# Patient Record
Sex: FEMALE | Race: WHITE | NOT HISPANIC OR LATINO | Employment: OTHER | URBAN - METROPOLITAN AREA
[De-identification: names, ages, dates, MRNs, and addresses within clinical notes are randomized per-mention and may not be internally consistent; named-entity substitution may affect disease eponyms.]

---

## 2017-01-09 ENCOUNTER — OFFICE VISIT - HEALTHEAST (OUTPATIENT)
Dept: FAMILY MEDICINE | Facility: CLINIC | Age: 62
End: 2017-01-09

## 2017-01-09 DIAGNOSIS — R05.9 COUGH: ICD-10-CM

## 2017-01-09 ASSESSMENT — MIFFLIN-ST. JEOR: SCORE: 1178.18

## 2017-05-12 ENCOUNTER — COMMUNICATION - HEALTHEAST (OUTPATIENT)
Dept: FAMILY MEDICINE | Facility: CLINIC | Age: 62
End: 2017-05-12

## 2017-07-01 ENCOUNTER — HEALTH MAINTENANCE LETTER (OUTPATIENT)
Age: 62
End: 2017-07-01

## 2017-08-28 ENCOUNTER — COMMUNICATION - HEALTHEAST (OUTPATIENT)
Dept: FAMILY MEDICINE | Facility: CLINIC | Age: 62
End: 2017-08-28

## 2017-08-28 DIAGNOSIS — E78.5 HYPERLIPEMIA: ICD-10-CM

## 2017-09-02 ENCOUNTER — COMMUNICATION - HEALTHEAST (OUTPATIENT)
Dept: FAMILY MEDICINE | Facility: CLINIC | Age: 62
End: 2017-09-02

## 2017-09-02 DIAGNOSIS — M54.16 LUMBAR RADICULOPATHY: ICD-10-CM

## 2017-10-22 ENCOUNTER — COMMUNICATION - HEALTHEAST (OUTPATIENT)
Dept: FAMILY MEDICINE | Facility: CLINIC | Age: 62
End: 2017-10-22

## 2017-10-22 DIAGNOSIS — I10 ESSENTIAL HYPERTENSION: ICD-10-CM

## 2017-12-01 ENCOUNTER — COMMUNICATION - HEALTHEAST (OUTPATIENT)
Dept: FAMILY MEDICINE | Facility: CLINIC | Age: 62
End: 2017-12-01

## 2017-12-01 DIAGNOSIS — E78.5 HYPERLIPEMIA: ICD-10-CM

## 2018-01-19 ENCOUNTER — COMMUNICATION - HEALTHEAST (OUTPATIENT)
Dept: FAMILY MEDICINE | Facility: CLINIC | Age: 63
End: 2018-01-19

## 2018-01-19 DIAGNOSIS — I10 ESSENTIAL HYPERTENSION: ICD-10-CM

## 2018-02-27 ENCOUNTER — OFFICE VISIT - HEALTHEAST (OUTPATIENT)
Dept: FAMILY MEDICINE | Facility: CLINIC | Age: 63
End: 2018-02-27

## 2018-02-27 DIAGNOSIS — M89.9 DISORDER OF BONE AND CARTILAGE: ICD-10-CM

## 2018-02-27 DIAGNOSIS — Z12.31 VISIT FOR SCREENING MAMMOGRAM: ICD-10-CM

## 2018-02-27 DIAGNOSIS — I10 HTN (HYPERTENSION): ICD-10-CM

## 2018-02-27 DIAGNOSIS — M94.9 DISORDER OF BONE AND CARTILAGE: ICD-10-CM

## 2018-02-27 DIAGNOSIS — E78.00 HYPERCHOLESTEREMIA: ICD-10-CM

## 2018-02-27 DIAGNOSIS — M54.16 LUMBAR RADICULOPATHY: ICD-10-CM

## 2018-02-27 DIAGNOSIS — R53.83 FATIGUE: ICD-10-CM

## 2018-02-27 DIAGNOSIS — Z00.00 ROUTINE GENERAL MEDICAL EXAMINATION AT A HEALTH CARE FACILITY: ICD-10-CM

## 2018-02-27 LAB
ALBUMIN SERPL-MCNC: 3.7 G/DL (ref 3.5–5)
ALP SERPL-CCNC: 85 U/L (ref 45–120)
ALT SERPL W P-5'-P-CCNC: 26 U/L (ref 0–45)
ANION GAP SERPL CALCULATED.3IONS-SCNC: 9 MMOL/L (ref 5–18)
AST SERPL W P-5'-P-CCNC: 26 U/L (ref 0–40)
BILIRUB SERPL-MCNC: 0.6 MG/DL (ref 0–1)
BUN SERPL-MCNC: 13 MG/DL (ref 8–22)
CALCIUM SERPL-MCNC: 8.9 MG/DL (ref 8.5–10.5)
CHLORIDE BLD-SCNC: 107 MMOL/L (ref 98–107)
CHOLEST SERPL-MCNC: 193 MG/DL
CO2 SERPL-SCNC: 24 MMOL/L (ref 22–31)
CREAT SERPL-MCNC: 0.64 MG/DL (ref 0.6–1.1)
ERYTHROCYTE [DISTWIDTH] IN BLOOD BY AUTOMATED COUNT: 12.6 % (ref 11–14.5)
FASTING STATUS PATIENT QL REPORTED: YES
GFR SERPL CREATININE-BSD FRML MDRD: >60 ML/MIN/1.73M2
GLUCOSE BLD-MCNC: 86 MG/DL (ref 70–125)
HBA1C MFR BLD: 5.6 % (ref 3.5–6)
HCT VFR BLD AUTO: 39.9 % (ref 35–47)
HDLC SERPL-MCNC: 38 MG/DL
HGB BLD-MCNC: 13.4 G/DL (ref 12–16)
LDLC SERPL CALC-MCNC: 113 MG/DL
MCH RBC QN AUTO: 29.9 PG (ref 27–34)
MCHC RBC AUTO-ENTMCNC: 33.6 G/DL (ref 32–36)
MCV RBC AUTO: 89 FL (ref 80–100)
PLATELET # BLD AUTO: 254 THOU/UL (ref 140–440)
PMV BLD AUTO: 7.2 FL (ref 7–10)
POTASSIUM BLD-SCNC: 3.9 MMOL/L (ref 3.5–5)
PROT SERPL-MCNC: 6.5 G/DL (ref 6–8)
RBC # BLD AUTO: 4.49 MILL/UL (ref 3.8–5.4)
SODIUM SERPL-SCNC: 140 MMOL/L (ref 136–145)
TRIGL SERPL-MCNC: 209 MG/DL
TSH SERPL DL<=0.005 MIU/L-ACNC: 1.97 UIU/ML (ref 0.3–5)
WBC: 6 THOU/UL (ref 4–11)

## 2018-02-27 ASSESSMENT — MIFFLIN-ST. JEOR: SCORE: 1220.14

## 2018-02-28 LAB
25(OH)D3 SERPL-MCNC: 50.6 NG/ML (ref 30–80)
25(OH)D3 SERPL-MCNC: 50.6 NG/ML (ref 30–80)

## 2018-03-01 ENCOUNTER — COMMUNICATION - HEALTHEAST (OUTPATIENT)
Dept: FAMILY MEDICINE | Facility: CLINIC | Age: 63
End: 2018-03-01

## 2018-03-01 DIAGNOSIS — E78.5 HYPERLIPEMIA: ICD-10-CM

## 2018-03-04 ENCOUNTER — COMMUNICATION - HEALTHEAST (OUTPATIENT)
Dept: FAMILY MEDICINE | Facility: CLINIC | Age: 63
End: 2018-03-04

## 2018-03-04 DIAGNOSIS — M54.16 LUMBAR RADICULOPATHY: ICD-10-CM

## 2018-04-11 ENCOUNTER — COMMUNICATION - HEALTHEAST (OUTPATIENT)
Dept: FAMILY MEDICINE | Facility: CLINIC | Age: 63
End: 2018-04-11

## 2018-04-18 ENCOUNTER — COMMUNICATION - HEALTHEAST (OUTPATIENT)
Dept: FAMILY MEDICINE | Facility: CLINIC | Age: 63
End: 2018-04-18

## 2018-04-19 ENCOUNTER — COMMUNICATION - HEALTHEAST (OUTPATIENT)
Dept: FAMILY MEDICINE | Facility: CLINIC | Age: 63
End: 2018-04-19

## 2018-04-19 DIAGNOSIS — I10 ESSENTIAL HYPERTENSION: ICD-10-CM

## 2018-04-23 ENCOUNTER — COMMUNICATION - HEALTHEAST (OUTPATIENT)
Dept: FAMILY MEDICINE | Facility: CLINIC | Age: 63
End: 2018-04-23

## 2018-05-30 ENCOUNTER — COMMUNICATION - HEALTHEAST (OUTPATIENT)
Dept: FAMILY MEDICINE | Facility: CLINIC | Age: 63
End: 2018-05-30

## 2018-05-30 DIAGNOSIS — E78.5 HYPERLIPEMIA: ICD-10-CM

## 2018-07-17 ENCOUNTER — COMMUNICATION - HEALTHEAST (OUTPATIENT)
Dept: FAMILY MEDICINE | Facility: CLINIC | Age: 63
End: 2018-07-17

## 2018-10-13 ENCOUNTER — COMMUNICATION - HEALTHEAST (OUTPATIENT)
Dept: FAMILY MEDICINE | Facility: CLINIC | Age: 63
End: 2018-10-13

## 2019-01-12 ENCOUNTER — COMMUNICATION - HEALTHEAST (OUTPATIENT)
Dept: FAMILY MEDICINE | Facility: CLINIC | Age: 64
End: 2019-01-12

## 2019-02-06 ENCOUNTER — RECORDS - HEALTHEAST (OUTPATIENT)
Dept: ADMINISTRATIVE | Facility: OTHER | Age: 64
End: 2019-02-06

## 2019-02-06 ENCOUNTER — APPOINTMENT (OUTPATIENT)
Dept: CT IMAGING | Facility: CLINIC | Age: 64
End: 2019-02-06
Attending: EMERGENCY MEDICINE
Payer: COMMERCIAL

## 2019-02-06 ENCOUNTER — HOSPITAL ENCOUNTER (EMERGENCY)
Facility: CLINIC | Age: 64
Discharge: HOME | End: 2019-02-07
Attending: EMERGENCY MEDICINE | Admitting: EMERGENCY MEDICINE
Payer: COMMERCIAL

## 2019-02-06 DIAGNOSIS — R10.32 ABDOMINAL PAIN, LEFT LOWER QUADRANT: ICD-10-CM

## 2019-02-06 DIAGNOSIS — K92.1 BLOOD IN STOOL: ICD-10-CM

## 2019-02-06 LAB
ANION GAP SERPL CALCULATED.3IONS-SCNC: 6 MMOL/L (ref 3–14)
BASOPHILS # BLD AUTO: 0 10E9/L (ref 0–0.2)
BASOPHILS NFR BLD AUTO: 0.3 %
BUN SERPL-MCNC: 9 MG/DL (ref 7–30)
CALCIUM SERPL-MCNC: 8.6 MG/DL (ref 8.5–10.1)
CHLORIDE SERPL-SCNC: 103 MMOL/L (ref 94–109)
CO2 SERPL-SCNC: 30 MMOL/L (ref 20–32)
CREAT SERPL-MCNC: 0.72 MG/DL (ref 0.52–1.04)
DIFFERENTIAL METHOD BLD: ABNORMAL
EOSINOPHIL # BLD AUTO: 0 10E9/L (ref 0–0.7)
EOSINOPHIL NFR BLD AUTO: 0.3 %
ERYTHROCYTE [DISTWIDTH] IN BLOOD BY AUTOMATED COUNT: 12.4 % (ref 10–15)
GFR SERPL CREATININE-BSD FRML MDRD: 89 ML/MIN/{1.73_M2}
GLUCOSE SERPL-MCNC: 106 MG/DL (ref 70–99)
HCT VFR BLD AUTO: 45.1 % (ref 35–47)
HEMOCCULT STL QL: POSITIVE
HGB BLD-MCNC: 14.9 G/DL (ref 11.7–15.7)
IMM GRANULOCYTES # BLD: 0 10E9/L (ref 0–0.4)
IMM GRANULOCYTES NFR BLD: 0.3 %
INTERNAL QC OK POCT: YES
LYMPHOCYTES # BLD AUTO: 2.8 10E9/L (ref 0.8–5.3)
LYMPHOCYTES NFR BLD AUTO: 20.7 %
MCH RBC QN AUTO: 29.6 PG (ref 26.5–33)
MCHC RBC AUTO-ENTMCNC: 33 G/DL (ref 31.5–36.5)
MCV RBC AUTO: 90 FL (ref 78–100)
MONOCYTES # BLD AUTO: 1 10E9/L (ref 0–1.3)
MONOCYTES NFR BLD AUTO: 7.2 %
NEUTROPHILS # BLD AUTO: 9.8 10E9/L (ref 1.6–8.3)
NEUTROPHILS NFR BLD AUTO: 71.2 %
NRBC # BLD AUTO: 0 10*3/UL
NRBC BLD AUTO-RTO: 0 /100
PLATELET # BLD AUTO: 336 10E9/L (ref 150–450)
POTASSIUM SERPL-SCNC: 3.5 MMOL/L (ref 3.4–5.3)
RBC # BLD AUTO: 5.03 10E12/L (ref 3.8–5.2)
SODIUM SERPL-SCNC: 139 MMOL/L (ref 133–144)
TEST CARD LOT NUMBER: ABNORMAL
WBC # BLD AUTO: 13.7 10E9/L (ref 4–11)

## 2019-02-06 PROCEDURE — 82272 OCCULT BLD FECES 1-3 TESTS: CPT | Performed by: EMERGENCY MEDICINE

## 2019-02-06 PROCEDURE — 80048 BASIC METABOLIC PNL TOTAL CA: CPT | Performed by: EMERGENCY MEDICINE

## 2019-02-06 PROCEDURE — 25000125 ZZHC RX 250: Performed by: EMERGENCY MEDICINE

## 2019-02-06 PROCEDURE — 96361 HYDRATE IV INFUSION ADD-ON: CPT | Performed by: EMERGENCY MEDICINE

## 2019-02-06 PROCEDURE — 99284 EMERGENCY DEPT VISIT MOD MDM: CPT | Mod: Z6 | Performed by: EMERGENCY MEDICINE

## 2019-02-06 PROCEDURE — 96374 THER/PROPH/DIAG INJ IV PUSH: CPT | Performed by: EMERGENCY MEDICINE

## 2019-02-06 PROCEDURE — 25000128 H RX IP 250 OP 636: Performed by: EMERGENCY MEDICINE

## 2019-02-06 PROCEDURE — 99285 EMERGENCY DEPT VISIT HI MDM: CPT | Mod: 25 | Performed by: EMERGENCY MEDICINE

## 2019-02-06 PROCEDURE — 74177 CT ABD & PELVIS W/CONTRAST: CPT

## 2019-02-06 PROCEDURE — 85025 COMPLETE CBC W/AUTO DIFF WBC: CPT | Performed by: EMERGENCY MEDICINE

## 2019-02-06 RX ORDER — IOPAMIDOL 755 MG/ML
78 INJECTION, SOLUTION INTRAVASCULAR ONCE
Status: COMPLETED | OUTPATIENT
Start: 2019-02-06 | End: 2019-02-06

## 2019-02-06 RX ORDER — ONDANSETRON 2 MG/ML
4 INJECTION INTRAMUSCULAR; INTRAVENOUS
Status: COMPLETED | OUTPATIENT
Start: 2019-02-06 | End: 2019-02-06

## 2019-02-06 RX ADMIN — SODIUM CHLORIDE 59 ML: 9 INJECTION, SOLUTION INTRAVENOUS at 22:50

## 2019-02-06 RX ADMIN — SODIUM CHLORIDE, POTASSIUM CHLORIDE, SODIUM LACTATE AND CALCIUM CHLORIDE 1000 ML: 600; 310; 30; 20 INJECTION, SOLUTION INTRAVENOUS at 22:30

## 2019-02-06 RX ADMIN — ONDANSETRON 4 MG: 2 INJECTION INTRAMUSCULAR; INTRAVENOUS at 22:31

## 2019-02-06 RX ADMIN — IOPAMIDOL 78 ML: 755 INJECTION, SOLUTION INTRAVENOUS at 22:50

## 2019-02-06 ASSESSMENT — ENCOUNTER SYMPTOMS
NEUROLOGICAL NEGATIVE: 1
ABDOMINAL PAIN: 1
CONSTITUTIONAL NEGATIVE: 1
EYES NEGATIVE: 1
NAUSEA: 1
PSYCHIATRIC NEGATIVE: 1
BLOOD IN STOOL: 1
ENDOCRINE NEGATIVE: 1
ALLERGIC/IMMUNOLOGIC NEGATIVE: 1
RESPIRATORY NEGATIVE: 1
HEMATOLOGIC/LYMPHATIC NEGATIVE: 1

## 2019-02-06 ASSESSMENT — MIFFLIN-ST. JEOR: SCORE: 1197.26

## 2019-02-06 NOTE — ED AVS SNAPSHOT
[facility] Emergency Department  [address] [address]  Phone:  [phone]  Fax:  [phone]                                    Dahlia Torres   MRN: [id number]    Department:  [facility] Emergency Department   Date of Visit:  2/6/2019           After Visit Summary Signature Page    I have received my discharge instructions, and my questions have been answered. I have discussed any challenges I see with this plan with the nurse or doctor.    ..........................................................................................................................................  Patient/Patient Representative Signature      ..........................................................................................................................................  Patient Representative Print Name and Relationship to Patient    ..................................................               ................................................  Date                                   Time    ..........................................................................................................................................  Reviewed by Signature/Title    ...................................................              ..............................................  Date                                               Time          22EPIC Rev 08/18

## 2019-02-07 VITALS
SYSTOLIC BLOOD PRESSURE: 132 MMHG | HEART RATE: 98 BPM | DIASTOLIC BLOOD PRESSURE: 86 MMHG | OXYGEN SATURATION: 96 % | WEIGHT: 160 LBS | HEIGHT: 60 IN | BODY MASS INDEX: 31.41 KG/M2 | RESPIRATION RATE: 18 BRPM | TEMPERATURE: 99.1 F

## 2019-02-07 NOTE — DISCHARGE INSTRUCTIONS
1) exact cause of your sudden onset of lower abdominal pain and blood in your stool is not clear.  Your workup and time in the emergency department does not suggest a life-threatening GI bleed, however this cannot be completely excluded.  You have an abnormal CT scan showing inflammation in the descending colon and sigmoid colon  2) we have discussed that the exact cause of the inflammation noted on your CAT scan imaging is not clear.  There is no indication for antibiotics at this time.  3)return for reevaluation if bleeding worsens or you  develop progressive abdominal pain or develop a fever or any new symptoms of concern  4) follow-up care with [facility] is recommended for colonoscopy and additional care

## 2019-02-07 NOTE — ED NOTES
63-year-old female presents to the ER with complaints of lower abd cramping and bright red bloody stools that started last night about midnight. Pt states she has never had this happen before. Pt states some of the bloody has come out with mucus as well. No nausea and vomiting.

## 2019-02-07 NOTE — ED PROVIDER NOTES
History     Chief Complaint   Patient presents with     Rectal Bleeding     Abdominal Pain     HPI  Dahlia Torres is a 63 year old female who presents for evaluation for lower abdominal cramping and bloody stools within the last 24 hours.. Patient arrived by private car with her .  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  She reports lower abdominal cramping and bloody stools over the last 24 hours since around midnight.  She is having intermittent bloody stools.  She is reporting no prior history of diverticulitis per report she had a colonoscopy about 10 years ago that showed diverticulosis.  She is currently on lisinopril hydrochlorothiazide for hypertension, gabapentin for restless legs.  She is reporting no recent antibiotic use, no fever, no chills no back pain.  No unintentional weight loss.. History of external hemorrhoids because of persistent bloody stools, with lower abdominal cramping she presented for further care and evaluation.    Allergies:  No Known Allergies    Problem List:    There are no active problems to display for this patient.       Past Medical History:    No past medical history on file.    Past Surgical History:    No past surgical history on file.    Family History:    No family history on file.    Social History:  Marital Status:   [2]  Social History     Tobacco Use     Smoking status: Not on file   Substance Use Topics     Alcohol use: Not on file     Drug use: Not on file        Medications:      No current outpatient medications on file.      Review of Systems   Constitutional: Negative.    HENT: Negative.    Eyes: Negative.    Respiratory: Negative.    Gastrointestinal: Positive for abdominal pain, blood in stool and nausea.   Endocrine: Negative.    Genitourinary: Negative.    Skin: Negative.    Allergic/Immunologic: Negative.    Neurological: Negative.    Hematological: Negative.    Psychiatric/Behavioral: Negative.    All other  systems reviewed and are negative.      Physical Exam   BP: 138/89  Pulse: 110  Temp: 99.1  F (37.3  C)  Resp: 18  Height: 152.4 cm (5')  Weight: 72.6 kg (160 lb)  SpO2: 95 %      Physical Exam   Constitutional: She appears well-developed and well-nourished. No distress.   HENT:   Head: Normocephalic and atraumatic.   Eyes: EOM are normal. Pupils are equal, round, and reactive to light. Right eye exhibits no discharge. Left eye exhibits no discharge. No scleral icterus.   Neck: Normal range of motion. Neck supple.   Cardiovascular: Normal rate. Exam reveals no gallop and no friction rub.   No murmur heard.  Pulmonary/Chest: Effort normal and breath sounds normal. No stridor. No respiratory distress. She has no wheezes. She has no rales. She exhibits no tenderness.   Abdominal: Soft. There is tenderness. There is guarding. There is no rebound.       Genitourinary: Rectal exam shows guaiac positive stool.   Skin: Capillary refill takes less than 2 seconds. She is not diaphoretic.   Psychiatric: She has a normal mood and affect. Her behavior is normal. Judgment and thought content normal.       ED Course        Procedures               Critical Care time:  none                ED medications;  Medications   lactated ringers BOLUS 1,000 mL (1,000 mLs Intravenous New Bag 2/6/19 2230)   ondansetron (ZOFRAN) injection 4 mg (4 mg Intravenous Given 2/6/19 2231)   iopamidol (ISOVUE-370) solution 78 mL (78 mLs Intravenous Given 2/6/19 2250)   Saline Flush (59 mLs Intravenous Given 2/6/19 2250)       ED labs and imaging:  Results for orders placed or performed during the hospital encounter of 02/06/19 (from the past 24 hour(s))   CBC with platelets differential   Result Value Ref Range    WBC 13.7 (H) 4.0 - 11.0 10e9/L    RBC Count 5.03 3.8 - 5.2 10e12/L    Hemoglobin 14.9 11.7 - 15.7 g/dL    Hematocrit 45.1 35.0 - 47.0 %    MCV 90 78 - 100 fl    MCH 29.6 26.5 - 33.0 pg    MCHC 33.0 31.5 - 36.5 g/dL    RDW 12.4 10.0 - 15.0 %     Platelet Count 336 150 - 450 10e9/L    Diff Method Automated Method     % Neutrophils 71.2 %    % Lymphocytes 20.7 %    % Monocytes 7.2 %    % Eosinophils 0.3 %    % Basophils 0.3 %    % Immature Granulocytes 0.3 %    Nucleated RBCs 0 0 /100    Absolute Neutrophil 9.8 (H) 1.6 - 8.3 10e9/L    Absolute Lymphocytes 2.8 0.8 - 5.3 10e9/L    Absolute Monocytes 1.0 0.0 - 1.3 10e9/L    Absolute Eosinophils 0.0 0.0 - 0.7 10e9/L    Absolute Basophils 0.0 0.0 - 0.2 10e9/L    Abs Immature Granulocytes 0.0 0 - 0.4 10e9/L    Absolute Nucleated RBC 0.0    Basic metabolic panel   Result Value Ref Range    Sodium 139 133 - 144 mmol/L    Potassium 3.5 3.4 - 5.3 mmol/L    Chloride 103 94 - 109 mmol/L    Carbon Dioxide 30 20 - 32 mmol/L    Anion Gap 6 3 - 14 mmol/L    Glucose 106 (H) 70 - 99 mg/dL    Urea Nitrogen 9 7 - 30 mg/dL    Creatinine 0.72 0.52 - 1.04 mg/dL    GFR Estimate 89 >60 mL/min/[1.73_m2]    GFR Estimate If Black >90 >60 mL/min/[1.73_m2]    Calcium 8.6 8.5 - 10.1 mg/dL   CT Abdomen Pelvis w Contrast    Narrative    CT ABDOMEN AND PELVIS WITH CONTRAST   2/6/2019 10:57 PM     HISTORY: 24 hours of lower abdominal pain and cramping. Diarrhea.  Blood per rectum.    COMPARISON: None.    TECHNIQUE: Following the uneventful administration of 78 mL Isovue  -370 intravenous contrast, helical sections were acquired from the top  of the diaphragm through the pubic symphysis. Coronal reconstructions  were generated. Radiation dose for this scan was reduced using  automated exposure control, adjustment of the mA and/or kV according  to the patient's size, or iterative reconstruction technique.    FINDINGS:     Abdomen: The liver, spleen, pancreas and adrenal glands are  unremarkable. A few small low-attenuation lesions in the kidneys, too  small to characterize. The gallbladder is present. Small hiatal  hernia. No enlarged lymph nodes or free fluid in the upper abdomen.    Scan through the lower chest is significant for a small  calcified  granuloma in the left lung base.    Pelvis: The small and large bowel are normal in caliber. A few colonic  diverticula are present without convincing evidence of diverticulitis.  Mild wall thickening of the descending colon through the sigmoid  colon. Slight haziness is present within the fat about the  thick-walled colon. The appendix is unremarkable. No pneumatosis or  free intraperitoneal gas. The uterus is present. No enlarged lymph  nodes or free fluid in the pelvis.      Impression    IMPRESSION:   1. Mild wall thickening of the descending colon through the sigmoid  colon, likely representing infectious or inflammatory colitis.  2. No other cause of acute pain identified in the abdomen or pelvis.  3. Colonic diverticulosis without convincing evidence of  diverticulitis.     ED Vitals:  Vitals:    02/06/19 2200 02/06/19 2219 02/06/19 2230 02/06/19 2248   BP: 153/71  147/73    Pulse: 75  85    Resp:       Temp:       TempSrc:       SpO2: 97% 96% 95% 96%   Weight:       Height:             Assessments & Plan (with Medical Decision Making)   Clinical impression: 63-year-old female with a history of hypertension on lisinopril hydrochlorothiazide, history of restless leg on gabapentin, and a prior diagnosis of colonoscopy confirmed diverticulosis in 2009 who presented for evaluation for lower abdominal cramping and bright red blood per rectum.  Workup and imaging suggests acute inflammatory process involving the descending sigmoid colon which could be infectious or inflammatory.  Stable  GI bleed cannot be excluded.  Exam and imaging was not suspicious for mesenteric ischemia, or ischemic colitis.  Patient had no red flags on exam..  On my exam she was in no obvious distress she was hypertensive.  Her abdomen was soft non-distended with localized crampy discomfort in the lower abdomen.  Chaperoned rectal exam with Rosemary Ibarra RN-showed normal rectal tone, no external hemorrhoids appreciated no anal  fissure.  Guaiac positive.    ED course and Plan:  We discussed and reviewed differential diagnosis for lower abdominal cramping and bloody stools in the context of history of known diverticulosis confirmed by colonoscopy by report fropm 2009.  Patient understands that she is due for subsequent surveillance follow-up colonoscopy which is yet to be scheduled.  She is reporting no unintentional weight loss, and no other systemic symptoms.  She reported nausea with poor oral intake today although she has been drinking.  She was given  IV fluids.  We reviewed options for care including blood work and imaging studies.  Patient agreed to CT imaging to exclude diverticulitis.  Arrival white count is 13.7.  Hemoglobin is 14.9 hematocrit is 45.1.  Renal function was normal.    CT imaging today with water and IV contrast showed mild wall thickening of the descending colon through the sigmoid colon which was felt to be likely related to infectious or inflammatory colitis.  There was colonic diverticulosis noted but no evidence for diverticulitis appreciated.  Please see the interpreting radiologist  report above.  Patient did not have any bloody stools during her course of care in the emergency department.  I reviewed  CT imaging results with patient and spouse who was at the bedside.    At this time there is no indication for hospitalization for emergent colonoscopy or endoscopy and no indications for antibiotics.  Patient and  understand that the cause of the inflammation noted in the descending and sigmoid colon is not clear, however follow-up care is recommended.  Patient reports her last colonoscopy was completed by [facility] in 2009.  I recommended she call [facility] in the morning to review her visit in  the emergency department and discussion for follow-up care.  We also reviewed reasons to return to the emergency department for care.        Disclaimer: This note consists of  symbols derived from keyboarding, dictation and/or voice recognition software. As a result, there may be errors in the script that have gone undetected. Please consider this when interpreting information found in this chart.  I have reviewed the nursing notes.    I have reviewed the findings, diagnosis, plan and need for follow up with the patient.          Medication List      There are no discharge medications for this visit.         Final diagnoses:   Blood in stool - Uncertain cause, may be related to inflammation noted on CT imaging   Abdominal pain, left lower quadrant - Uncertain cause, may be due to inflammation noted in the descending and sigmoid colon on CT imaging       2/6/2019   [facility] EMERGENCY DEPARTMENT     Berto Lugo MD  02/07/19 0610

## 2019-02-28 ENCOUNTER — COMMUNICATION - HEALTHEAST (OUTPATIENT)
Dept: FAMILY MEDICINE | Facility: CLINIC | Age: 64
End: 2019-02-28

## 2019-02-28 DIAGNOSIS — E78.5 HYPERLIPEMIA: ICD-10-CM

## 2019-02-28 DIAGNOSIS — M54.16 LUMBAR RADICULOPATHY: ICD-10-CM

## 2019-03-21 ENCOUNTER — HOSPITAL ENCOUNTER (OUTPATIENT)
Dept: MAMMOGRAPHY | Facility: CLINIC | Age: 64
Discharge: HOME | End: 2019-03-21
Attending: FAMILY MEDICINE

## 2019-03-21 DIAGNOSIS — Z12.31 VISIT FOR SCREENING MAMMOGRAM: ICD-10-CM

## 2019-03-29 ENCOUNTER — HOSPITAL ENCOUNTER (OUTPATIENT)
Dept: MAMMOGRAPHY | Facility: CLINIC | Age: 64
Discharge: HOME | End: 2019-03-29
Attending: FAMILY MEDICINE

## 2019-03-29 DIAGNOSIS — N64.89 BREAST ASYMMETRY: ICD-10-CM

## 2019-03-30 ENCOUNTER — COMMUNICATION - HEALTHEAST (OUTPATIENT)
Dept: FAMILY MEDICINE | Facility: CLINIC | Age: 64
End: 2019-03-30

## 2019-03-30 DIAGNOSIS — I10 HTN (HYPERTENSION): ICD-10-CM

## 2019-04-05 ENCOUNTER — COMMUNICATION - HEALTHEAST (OUTPATIENT)
Dept: FAMILY MEDICINE | Facility: CLINIC | Age: 64
End: 2019-04-05

## 2019-04-05 DIAGNOSIS — E55.9 VITAMIN D DEFICIENCY: ICD-10-CM

## 2019-04-15 ENCOUNTER — OFFICE VISIT - HEALTHEAST (OUTPATIENT)
Dept: FAMILY MEDICINE | Facility: CLINIC | Age: 64
End: 2019-04-15

## 2019-04-15 DIAGNOSIS — M54.16 LUMBAR RADICULOPATHY: ICD-10-CM

## 2019-04-15 DIAGNOSIS — Z00.00 ROUTINE GENERAL MEDICAL EXAMINATION AT A HEALTH CARE FACILITY: ICD-10-CM

## 2019-04-15 DIAGNOSIS — I10 ESSENTIAL HYPERTENSION: ICD-10-CM

## 2019-04-15 DIAGNOSIS — K92.1 BLOOD IN STOOL: ICD-10-CM

## 2019-04-15 DIAGNOSIS — I10 HTN (HYPERTENSION): ICD-10-CM

## 2019-04-15 DIAGNOSIS — M81.0 AGE-RELATED OSTEOPOROSIS WITHOUT CURRENT PATHOLOGICAL FRACTURE: ICD-10-CM

## 2019-04-15 DIAGNOSIS — R53.82 CHRONIC FATIGUE: ICD-10-CM

## 2019-04-15 DIAGNOSIS — E78.00 HYPERCHOLESTEREMIA: ICD-10-CM

## 2019-04-15 LAB
ALBUMIN SERPL-MCNC: 3.7 G/DL (ref 3.5–5)
ALP SERPL-CCNC: 88 U/L (ref 45–120)
ALT SERPL W P-5'-P-CCNC: 27 U/L (ref 0–45)
ANION GAP SERPL CALCULATED.3IONS-SCNC: 10 MMOL/L (ref 5–18)
AST SERPL W P-5'-P-CCNC: 26 U/L (ref 0–40)
BILIRUB SERPL-MCNC: 0.1 MG/DL (ref 0–1)
BUN SERPL-MCNC: 17 MG/DL (ref 8–22)
CALCIUM SERPL-MCNC: 9.4 MG/DL (ref 8.5–10.5)
CHLORIDE BLD-SCNC: 107 MMOL/L (ref 98–107)
CHOLEST SERPL-MCNC: 127 MG/DL
CO2 SERPL-SCNC: 27 MMOL/L (ref 22–31)
CREAT SERPL-MCNC: 0.7 MG/DL (ref 0.6–1.1)
ERYTHROCYTE [DISTWIDTH] IN BLOOD BY AUTOMATED COUNT: 12.4 % (ref 11–14.5)
FASTING STATUS PATIENT QL REPORTED: YES
GFR SERPL CREATININE-BSD FRML MDRD: >60 ML/MIN/1.73M2
GLUCOSE BLD-MCNC: 87 MG/DL (ref 70–125)
HCT VFR BLD AUTO: 38 % (ref 35–47)
HDLC SERPL-MCNC: 25 MG/DL
HGB BLD-MCNC: 12.9 G/DL (ref 12–16)
LDLC SERPL CALC-MCNC: 55 MG/DL
MCH RBC QN AUTO: 30 PG (ref 27–34)
MCHC RBC AUTO-ENTMCNC: 34.1 G/DL (ref 32–36)
MCV RBC AUTO: 88 FL (ref 80–100)
PLATELET # BLD AUTO: 262 THOU/UL (ref 140–440)
PMV BLD AUTO: 7.6 FL (ref 7–10)
POTASSIUM BLD-SCNC: 3.4 MMOL/L (ref 3.5–5)
PROT SERPL-MCNC: 6.4 G/DL (ref 6–8)
RBC # BLD AUTO: 4.32 MILL/UL (ref 3.8–5.4)
SODIUM SERPL-SCNC: 144 MMOL/L (ref 136–145)
TRIGL SERPL-MCNC: 235 MG/DL
TSH SERPL DL<=0.005 MIU/L-ACNC: 2.08 UIU/ML (ref 0.3–5)
WBC: 5.3 THOU/UL (ref 4–11)

## 2019-04-15 ASSESSMENT — MIFFLIN-ST. JEOR: SCORE: 1211.07

## 2019-04-16 LAB
25(OH)D3 SERPL-MCNC: 59.4 NG/ML (ref 30–80)
HPV SOURCE: NORMAL
HUMAN PAPILLOMA VIRUS 16 DNA: NEGATIVE
HUMAN PAPILLOMA VIRUS 18 DNA: NEGATIVE
HUMAN PAPILLOMA VIRUS FINAL DIAGNOSIS: NORMAL
HUMAN PAPILLOMA VIRUS OTHER HR: NEGATIVE
SPECIMEN DESCRIPTION: NORMAL

## 2019-04-17 ENCOUNTER — AMBULATORY - HEALTHEAST (OUTPATIENT)
Dept: SCHEDULING | Facility: CLINIC | Age: 64
End: 2019-04-17

## 2019-04-17 DIAGNOSIS — M81.0 AGE-RELATED OSTEOPOROSIS WITHOUT CURRENT PATHOLOGICAL FRACTURE: ICD-10-CM

## 2019-04-23 LAB
BKR LAB AP ABNORMAL BLEEDING: NO
BKR LAB AP BIRTH CONTROL/HORMONES: NORMAL
BKR LAB AP CERVICAL APPEARANCE: NORMAL
BKR LAB AP GYN ADEQUACY: NORMAL
BKR LAB AP GYN INTERPRETATION: NORMAL
BKR LAB AP HPV REFLEX: NORMAL
BKR LAB AP LMP: NORMAL
BKR LAB AP PATIENT STATUS: NO
BKR LAB AP PREVIOUS ABNORMAL: NO
BKR LAB AP PREVIOUS NORMAL: NORMAL
HIGH RISK?: NO
PATH REPORT.COMMENTS IMP SPEC: NORMAL
RESULT FLAG (HE HISTORICAL CONVERSION): NORMAL

## 2019-05-14 ENCOUNTER — RECORDS - HEALTHEAST (OUTPATIENT)
Dept: ADMINISTRATIVE | Facility: OTHER | Age: 64
End: 2019-05-14

## 2019-05-15 ENCOUNTER — RECORDS - HEALTHEAST (OUTPATIENT)
Dept: ADMINISTRATIVE | Facility: OTHER | Age: 64
End: 2019-05-15

## 2019-05-22 ENCOUNTER — RECORDS - HEALTHEAST (OUTPATIENT)
Dept: LAB | Facility: HOSPITAL | Age: 64
End: 2019-05-22

## 2019-05-23 LAB — MEV IGG SER IA-ACNC: POSITIVE

## 2019-05-24 LAB
GAMMA INTERFERON BACKGROUND BLD IA-ACNC: 0.17 IU/ML
M TB IFN-G BLD-IMP: NEGATIVE
MITOGEN IGNF BCKGRD COR BLD-ACNC: 0.04 IU/ML
MITOGEN IGNF BCKGRD COR BLD-ACNC: 0.07 IU/ML
MUV IGG SER QL IA: POSITIVE
QTF INTERPRETATION: NORMAL
QTF MITOGEN - NIL: 8.21 IU/ML
VZV IGG SER QL IA: POSITIVE

## 2019-05-29 ENCOUNTER — COMMUNICATION - HEALTHEAST (OUTPATIENT)
Dept: FAMILY MEDICINE | Facility: CLINIC | Age: 64
End: 2019-05-29

## 2019-05-29 DIAGNOSIS — M54.16 LUMBAR RADICULOPATHY: ICD-10-CM

## 2019-05-29 DIAGNOSIS — E78.5 HYPERLIPEMIA: ICD-10-CM

## 2019-07-09 ENCOUNTER — COMMUNICATION - HEALTHEAST (OUTPATIENT)
Dept: FAMILY MEDICINE | Facility: CLINIC | Age: 64
End: 2019-07-09

## 2019-07-09 DIAGNOSIS — E55.9 VITAMIN D DEFICIENCY: ICD-10-CM

## 2019-10-04 ENCOUNTER — COMMUNICATION - HEALTHEAST (OUTPATIENT)
Dept: FAMILY MEDICINE | Facility: CLINIC | Age: 64
End: 2019-10-04

## 2019-10-04 DIAGNOSIS — E55.9 VITAMIN D DEFICIENCY: ICD-10-CM

## 2019-12-29 ENCOUNTER — COMMUNICATION - HEALTHEAST (OUTPATIENT)
Dept: FAMILY MEDICINE | Facility: CLINIC | Age: 64
End: 2019-12-29

## 2019-12-29 DIAGNOSIS — E55.9 VITAMIN D DEFICIENCY: ICD-10-CM

## 2020-04-01 ENCOUNTER — COMMUNICATION - HEALTHEAST (OUTPATIENT)
Dept: FAMILY MEDICINE | Facility: CLINIC | Age: 65
End: 2020-04-01

## 2020-04-01 DIAGNOSIS — E55.9 VITAMIN D DEFICIENCY: ICD-10-CM

## 2020-04-24 ENCOUNTER — COMMUNICATION - HEALTHEAST (OUTPATIENT)
Dept: FAMILY MEDICINE | Facility: CLINIC | Age: 65
End: 2020-04-24

## 2020-04-24 DIAGNOSIS — I10 HTN (HYPERTENSION): ICD-10-CM

## 2020-05-24 ENCOUNTER — COMMUNICATION - HEALTHEAST (OUTPATIENT)
Dept: FAMILY MEDICINE | Facility: CLINIC | Age: 65
End: 2020-05-24

## 2020-05-24 DIAGNOSIS — E78.5 HYPERLIPEMIA: ICD-10-CM

## 2020-05-24 DIAGNOSIS — M54.16 LUMBAR RADICULOPATHY: ICD-10-CM

## 2020-06-21 ENCOUNTER — COMMUNICATION - HEALTHEAST (OUTPATIENT)
Dept: FAMILY MEDICINE | Facility: CLINIC | Age: 65
End: 2020-06-21

## 2020-06-21 DIAGNOSIS — E55.9 VITAMIN D DEFICIENCY: ICD-10-CM

## 2020-07-29 ENCOUNTER — COMMUNICATION - HEALTHEAST (OUTPATIENT)
Dept: FAMILY MEDICINE | Facility: CLINIC | Age: 65
End: 2020-07-29

## 2020-07-29 DIAGNOSIS — I10 HTN (HYPERTENSION): ICD-10-CM

## 2020-07-30 ENCOUNTER — COMMUNICATION - HEALTHEAST (OUTPATIENT)
Dept: FAMILY MEDICINE | Facility: CLINIC | Age: 65
End: 2020-07-30

## 2020-07-30 DIAGNOSIS — I10 HTN (HYPERTENSION): ICD-10-CM

## 2020-08-20 ENCOUNTER — OFFICE VISIT - HEALTHEAST (OUTPATIENT)
Dept: FAMILY MEDICINE | Facility: CLINIC | Age: 65
End: 2020-08-20

## 2020-08-20 DIAGNOSIS — Z00.00 ROUTINE GENERAL MEDICAL EXAMINATION AT A HEALTH CARE FACILITY: ICD-10-CM

## 2020-08-20 DIAGNOSIS — M54.16 LUMBAR RADICULOPATHY: ICD-10-CM

## 2020-08-20 DIAGNOSIS — E78.00 HYPERCHOLESTEREMIA: ICD-10-CM

## 2020-08-20 DIAGNOSIS — I10 ESSENTIAL HYPERTENSION: ICD-10-CM

## 2020-08-20 LAB
ALBUMIN SERPL-MCNC: 4.1 G/DL (ref 3.5–5)
ALP SERPL-CCNC: 76 U/L (ref 45–120)
ALT SERPL W P-5'-P-CCNC: 27 U/L (ref 0–45)
ANION GAP SERPL CALCULATED.3IONS-SCNC: 11 MMOL/L (ref 5–18)
AST SERPL W P-5'-P-CCNC: 28 U/L (ref 0–40)
BILIRUB SERPL-MCNC: 0.5 MG/DL (ref 0–1)
BUN SERPL-MCNC: 8 MG/DL (ref 8–22)
CALCIUM SERPL-MCNC: 9.3 MG/DL (ref 8.5–10.5)
CHLORIDE BLD-SCNC: 104 MMOL/L (ref 98–107)
CHOLEST SERPL-MCNC: 185 MG/DL
CO2 SERPL-SCNC: 26 MMOL/L (ref 22–31)
CREAT SERPL-MCNC: 0.73 MG/DL (ref 0.6–1.1)
ERYTHROCYTE [DISTWIDTH] IN BLOOD BY AUTOMATED COUNT: 12.4 % (ref 11–14.5)
FASTING STATUS PATIENT QL REPORTED: YES
GFR SERPL CREATININE-BSD FRML MDRD: >60 ML/MIN/1.73M2
GLUCOSE BLD-MCNC: 84 MG/DL (ref 70–125)
HCT VFR BLD AUTO: 42 % (ref 35–47)
HDLC SERPL-MCNC: 39 MG/DL
HGB BLD-MCNC: 13.7 G/DL (ref 12–16)
LDLC SERPL CALC-MCNC: 95 MG/DL
MCH RBC QN AUTO: 29.4 PG (ref 27–34)
MCHC RBC AUTO-ENTMCNC: 32.7 G/DL (ref 32–36)
MCV RBC AUTO: 90 FL (ref 80–100)
PLATELET # BLD AUTO: 268 THOU/UL (ref 140–440)
PMV BLD AUTO: 7.7 FL (ref 7–10)
POTASSIUM BLD-SCNC: 4 MMOL/L (ref 3.5–5)
PROT SERPL-MCNC: 6.6 G/DL (ref 6–8)
RBC # BLD AUTO: 4.67 MILL/UL (ref 3.8–5.4)
SODIUM SERPL-SCNC: 141 MMOL/L (ref 136–145)
TRIGL SERPL-MCNC: 256 MG/DL
TSH SERPL DL<=0.005 MIU/L-ACNC: 0.83 UIU/ML (ref 0.3–5)
WBC: 5.7 THOU/UL (ref 4–11)

## 2020-08-20 ASSESSMENT — MIFFLIN-ST. JEOR: SCORE: 1184.53

## 2020-08-21 LAB
25(OH)D3 SERPL-MCNC: 75.6 NG/ML (ref 30–80)
25(OH)D3 SERPL-MCNC: 75.6 NG/ML (ref 30–80)

## 2020-08-26 ENCOUNTER — COMMUNICATION - HEALTHEAST (OUTPATIENT)
Dept: FAMILY MEDICINE | Facility: CLINIC | Age: 65
End: 2020-08-26

## 2020-08-26 DIAGNOSIS — I10 HTN (HYPERTENSION): ICD-10-CM

## 2020-08-28 ENCOUNTER — COMMUNICATION - HEALTHEAST (OUTPATIENT)
Dept: FAMILY MEDICINE | Facility: CLINIC | Age: 65
End: 2020-08-28

## 2020-09-19 ENCOUNTER — COMMUNICATION - HEALTHEAST (OUTPATIENT)
Dept: FAMILY MEDICINE | Facility: CLINIC | Age: 65
End: 2020-09-19

## 2020-09-19 DIAGNOSIS — E55.9 VITAMIN D DEFICIENCY: ICD-10-CM

## 2020-12-15 ENCOUNTER — COMMUNICATION - HEALTHEAST (OUTPATIENT)
Dept: FAMILY MEDICINE | Facility: CLINIC | Age: 65
End: 2020-12-15

## 2020-12-15 DIAGNOSIS — E55.9 VITAMIN D DEFICIENCY: ICD-10-CM

## 2020-12-22 ENCOUNTER — COMMUNICATION - HEALTHEAST (OUTPATIENT)
Dept: FAMILY MEDICINE | Facility: CLINIC | Age: 65
End: 2020-12-22

## 2020-12-22 DIAGNOSIS — E55.9 VITAMIN D DEFICIENCY: ICD-10-CM

## 2021-02-20 ENCOUNTER — COMMUNICATION - HEALTHEAST (OUTPATIENT)
Dept: FAMILY MEDICINE | Facility: CLINIC | Age: 66
End: 2021-02-20

## 2021-02-20 DIAGNOSIS — I10 HTN (HYPERTENSION): ICD-10-CM

## 2021-03-09 ENCOUNTER — COMMUNICATION - HEALTHEAST (OUTPATIENT)
Dept: FAMILY MEDICINE | Facility: CLINIC | Age: 66
End: 2021-03-09

## 2021-03-09 DIAGNOSIS — E55.9 VITAMIN D DEFICIENCY: ICD-10-CM

## 2021-03-23 ENCOUNTER — COMMUNICATION - HEALTHEAST (OUTPATIENT)
Dept: FAMILY MEDICINE | Facility: CLINIC | Age: 66
End: 2021-03-23

## 2021-03-23 DIAGNOSIS — E78.5 HYPERLIPEMIA: ICD-10-CM

## 2021-03-31 ENCOUNTER — COMMUNICATION - HEALTHEAST (OUTPATIENT)
Dept: FAMILY MEDICINE | Facility: CLINIC | Age: 66
End: 2021-03-31

## 2021-03-31 DIAGNOSIS — E78.5 HYPERLIPEMIA: ICD-10-CM

## 2021-04-01 ENCOUNTER — COMMUNICATION - HEALTHEAST (OUTPATIENT)
Dept: FAMILY MEDICINE | Facility: CLINIC | Age: 66
End: 2021-04-01

## 2021-04-03 ENCOUNTER — RECORDS - HEALTHEAST (OUTPATIENT)
Dept: ADMINISTRATIVE | Facility: OTHER | Age: 66
End: 2021-04-03

## 2021-05-27 NOTE — TELEPHONE ENCOUNTER
RN cannot approve Refill Request    RN can NOT refill this medication med is not covered by policy/route to provider     . Last office visit: 11/9/2015 Milady Awan MD Last Physical: 2/27/2018 Last [facility] visit: Visit date not found Last visit same specialty: 1/9/2017 Carmen Mauricio MD.  Next visit within 3 mo: Visit date not found  Next physical within 3 mo: Visit date not found      Dahlia Harmon, [facility] Triage/Med Refill 4/5/2019    Requested Prescriptions   Pending Prescriptions Disp Refills     ergocalciferol (ERGOCALCIFEROL) 50,000 unit capsule [Pharmacy Med Name: VITAMIN D2 50,000IU (ERGO) CAP RX] 12 capsule 0     Sig: TAKE 1 CAPSULE BY MOUTH ONCE A WEEK FOR 12 DOSES    There is no refill protocol information for this order

## 2021-05-27 NOTE — PROGRESS NOTES
Assessment/ Plan     1. Routine general medical examination at a health care facility  As far as healthcare maintenance, we will update her Pap smear today.  If this is normal will likely be her last one.  She just had her mammogram this year and had to go back for extra views but ultimately came back normal.  Recommend shingles vaccine.  Also recommend colonoscopy, see below.  Discussed try to increase her physical activity.  - Gynecologic Cytology (PAP Smear)  - HM2(CBC w/o Differential)  - Varicella Zoster, Recombinant Vaccine IM    2. Age-related osteoporosis without current pathological fracture  Patient has osteoporosis and is currently taking calcium and vitamin D.  She did not follow through with her bone density last year, but states she will this year.  Her mom and grandmother both had osteoporosis.  Would recommend starting Fosamax unless there is a dramatic change in her bone density which I would not anticipate.  We discussed potential side effects and she is open to the idea.  - Vitamin D, Total (25-Hydroxy)  - DXA Bone Density Scan; Future    3. Hypercholesteremia  Her cholesterol is under good control with her statin.  She is tolerating it well.  - Comprehensive Metabolic Panel  - Lipid Cascade    4. Essential hypertension  Blood pressure is under good control with her current medication.  Refills done today.     5. Lumbar radiculopathy  Patient still is taking some gabapentin for some lumbar radiculopathy.  Symptoms are not severe at this point.    6. Blood in stool  She was seen in the emergency room in February with some blood in her stool and a CT that showed some inflammation of her colon.  Since then, she just has not felt well and has had some fatigue.  Would recommend that she follow-up with a colonoscopy.  Her last one was in 2011 and just showed diverticulosis.  - Ambulatory referral for Colonoscopy    7. Chronic fatigue  Patient feels like she has had more fatigue over the last couple of  months.  She works at a pretty stressful job and on the weekend she just feels exhausted.  - Thyroid Stimulating Hormone (TSH)      Subjective:     Dahlia Torres is a 63 y.o. female who presents for an annual exam.  Overall, she states she has been doing fairly well.  She did have an emergency room visit in February at [place] which I reviewed.  She was having some GI bleeding.  They did not abdominal CT which I reviewed that showed inflammation of the colon that was nonspecific.  They did not put on antibiotics or do anything differently.  She has not had return of the bleeding, has just felt really fatigued.  She has no family history of GI problems.  Would recommend that she get a colonoscopy her last one was in 2011 that I reviewed that showed just diverticulosis.  She just feels really tired by the time he gets to the weekend.  She just does not feel like she has a lot of motivation.  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  She has a history of osteoporosis and was supposed to get a bone density last year.  She states she forgot to follow through.  We discussed treatment options and she would be open to starting Fosamax.  Both her mother and grandmother had osteoporosis.  Unfortunately, she is not doing any regular exercise, but is not sedentary at work.  She is doing calcium and vitamin D.  Her high cholesterol and hypertension are under good control with her current medication.  As far as healthcare maintenance, she had her mammogram this year and had to go back for extra views.  Ultimately this was normal.  She is due for Pap today.  We discussed the new shingles vaccine and she would like to proceed with that.    Healthy Habits:   Regular Exercise: No  Sunscreen Use: Yes  Healthy Diet: Yes and No  Dental Visits Regularly: Yes  Seat Belt: Yes  Sexually active: Yes  Self Breast Exam Monthly:Yes  Colonoscopy: Yes  Lipid Profile: Yes  Glucose Screen: Yes  Prevention of Osteoporosis:  Yes  Last Dexa: Yes        Immunization History   Administered Date(s) Administered     Influenza, inj, historic,unspecified 2014     Tdap 2011     ZOSTER, RECOMBINANT, IM 04/15/2019     Immunization status: due today.     Gynecologic History  No LMP recorded. Patient is postmenopausal.  Contraception: none  Last Pap: . Results were: normal  Last mammogram: . Results were: normal      OB History    Para Term  AB Living   2 2 2         SAB TAB Ectopic Multiple Live Births                  # Outcome Date GA Lbr Bryan/2nd Weight Sex Delivery Anes PTL Lv   2 Term            1 Term                Current Outpatient Medications   Medication Sig Dispense Refill     atorvastatin (LIPITOR) 10 MG tablet TAKE ONE TABLET BY MOUTH EVERY NIGHT AT BEDTIME 90 tablet 0     calcium citrate-vitamin D (CITRACAL+D) 315-200 mg-unit per tablet Take 1 tablet by mouth daily.       ergocalciferol (ERGOCALCIFEROL) 50,000 unit capsule TAKE 1 CAPSULE BY MOUTH ONCE A WEEK FOR 12 DOSES 12 capsule 0     gabapentin (NEURONTIN) 600 MG tablet TAKE ONE-HALF TO ONE TABLET BY MOUTH EVERY NIGHT AT BEDTIME AS NEEDED 90 tablet 0     lisinopril-hydrochlorothiazide (PRINZIDE,ZESTORETIC) 20-12.5 mg per tablet Take 1 tablet by mouth daily. 90 tablet 3     No current facility-administered medications for this visit.      Past Medical History:   Diagnosis Date     GERD (gastroesophageal reflux disease)      Hyperlipidemia      Osteoarthritis      PONV (postoperative nausea and vomiting)      No past surgical history on file.  Patient has no known allergies.  No family history on file.  Social History     Socioeconomic History     Marital status:      Spouse name: Not on file     Number of children: Not on file     Years of education: Not on file     Highest education level: Not on file   Occupational History     Not on file   Social Needs     Financial resource strain: Not on file     Food insecurity:     Worry: Not on file  "    Inability: Not on file     Transportation needs:     Medical: Not on file     Non-medical: Not on file   Tobacco Use     Smoking status: Never Smoker     Smokeless tobacco: Never Used   Substance and Sexual Activity     Alcohol use: No     Drug use: Not on file     Sexual activity: Not on file   Lifestyle     Physical activity:     Days per week: Not on file     Minutes per session: Not on file     Stress: Not on file   Relationships     Social connections:     Talks on phone: Not on file     Gets together: Not on file     Attends Denominational service: Not on file     Active member of club or organization: Not on file     Attends meetings of clubs or organizations: Not on file     Relationship status: Not on file     Intimate partner violence:     Fear of current or ex partner: Not on file     Emotionally abused: Not on file     Physically abused: Not on file     Forced sexual activity: Not on file   Other Topics Concern     Not on file   Social History Narrative     Not on file       Review of Systems  General:  Denies problems  Eyes:  Denies problems  Ears/Nose/Throat:  Denies problems  Cardiovascular:  Denies problems  Respiratory:  Denies problems  Gastrointestinal:  Denies problems  Genitourinary:  Denies problems  Musculoskeletal:  Denies problems  Skin:  Denies problems  Neurologic:  Denies problems  Psychiatric:  Denies problems  Endocrine:  Denies problems  Heme/Lymphatic:  Denies problems  Allergic/Immunologic:  Denies problems    Objective:      Vitals:    04/15/19 0716   BP: 116/68   Pulse: 60   Resp: 16   Temp: 97.8  F (36.6  C)   Weight: 167 lb (75.8 kg)   Height: 4' 11.5\" (1.511 m)       Physical Exam:  General Appearance: Alert, cooperative, no distress, appears stated age  Head: Normocephalic, without obvious abnormality, atraumatic  Eyes: PERRL, conjunctiva/corneas clear, EOM's intact  Ears: Normal TM's and external ear canals, both ears  Nose: Nares normal, septum midline,mucosa normal, no " drainage  Throat: Lips, mucosa, and tongue normal; teeth and gums normal  Neck: Supple, symmetrical, trachea midline, no adenopathy; thyroid: not enlarged, symmetric, no tenderness/mass/nodules; no carotid bruit  Back: Symmetric, no curvature, ROM normal, no CVA tenderness  Lungs: Clear to auscultation bilaterally, respirations unlabored  Breasts: No breast masses, tenderness, asymmetry, or nipple discharge  Heart: Regular rate and rhythm, S1 and S2 normal, no murmur, rub, or gallop, Abdomen: Soft, non-tender, bowel sounds active all four quadrants,  no masses, no organomegaly  Pelvic: Normally developed genitalia with no external lesions or eruptions. Vagina and cervix show no lesions, inflammation, discharge or tenderness. Uterus normal to palpation.  No adnexal mass or tenderness.  Extremities: Extremities normal, atraumatic, no cyanosis or edema  Skin: Skin color, texture, turgor normal, no rashes or lesions  Lymph nodes: Cervical, supraclavicular, and axillary nodes normal  Neurologic: Normal        The following high BMI interventions were performed this visit: encouragement to exercise and dietary needs education    Results for orders placed or performed in visit on 02/27/18   Comprehensive Metabolic Panel   Result Value Ref Range    Sodium 140 136 - 145 mmol/L    Potassium 3.9 3.5 - 5.0 mmol/L    Chloride 107 98 - 107 mmol/L    CO2 24 22 - 31 mmol/L    Anion Gap, Calculation 9 5 - 18 mmol/L    Glucose 86 70 - 125 mg/dL    BUN 13 8 - 22 mg/dL    Creatinine 0.64 0.60 - 1.10 mg/dL    GFR MDRD Af Amer >60 >60 mL/min/1.73m2    GFR MDRD Non Af Amer >60 >60 mL/min/1.73m2    Bilirubin, Total 0.6 0.0 - 1.0 mg/dL    Calcium 8.9 8.5 - 10.5 mg/dL    Protein, Total 6.5 6.0 - 8.0 g/dL    Albumin 3.7 3.5 - 5.0 g/dL    Alkaline Phosphatase 85 45 - 120 U/L    AST 26 0 - 40 U/L    ALT 26 0 - 45 U/L   Glycosylated Hemoglobin A1c   Result Value Ref Range    Hemoglobin A1c 5.6 3.5 - 6.0 %   HM2(CBC w/o Differential)   Result  Value Ref Range    WBC 6.0 4.0 - 11.0 thou/uL    RBC 4.49 3.80 - 5.40 mill/uL    Hemoglobin 13.4 12.0 - 16.0 g/dL    Hematocrit 39.9 35.0 - 47.0 %    MCV 89 80 - 100 fL    MCH 29.9 27.0 - 34.0 pg    MCHC 33.6 32.0 - 36.0 g/dL    RDW 12.6 11.0 - 14.5 %    Platelets 254 140 - 440 thou/uL    MPV 7.2 7.0 - 10.0 fL   Lipid Cascade   Result Value Ref Range    Cholesterol 193 <=199 mg/dL    Triglycerides 209 (H) <=149 mg/dL    HDL Cholesterol 38 (L) >=50 mg/dL    LDL Calculated 113 <=129 mg/dL    Patient Fasting > 8hrs? Yes    Thyroid Stimulating Hormone (TSH)   Result Value Ref Range    TSH 1.97 0.30 - 5.00 uIU/mL   Vitamin D, Total (25-Hydroxy)   Result Value Ref Range    Vitamin D, Total (25-Hydroxy) 50.6 30.0 - 80.0 ng/mL       Milady Awan MD    This note has been dictated using voice recognition software. Any grammatical or context distortions are unintentional and inherent to the software

## 2021-05-27 NOTE — TELEPHONE ENCOUNTER
RN cannot approve Refill Request    RN can NOT refill this medication PCP messaged that patient is overdue for Labs and Office Visit and Protocol failed and RN gave three month refill. Last office visit: 11/9/2015 Milady Awan MD Last Physical: 2/27/2018 Last MTM visit: Visit date not found Last visit same specialty: 1/9/2017 Carmen Mauricio MD.  Next visit within 3 mo: Visit date not found  Next physical within 3 mo: Visit date not found  Last OV 2/27/18  Last refill 4/11/18 for 30/11    Kassidy Rich, [facility] Triage/Med Refill 3/31/2019    Requested Prescriptions   Pending Prescriptions Disp Refills     lisinopril-hydrochlorothiazide (PRINZIDE,ZESTORETIC) 20-12.5 mg per tablet [Pharmacy Med Name: LISINOPRIL-HCTZ 20/12.5MG TABLETS] 30 tablet 0     Sig: TAKE 1 TABLET BY MOUTH DAILY    Diuretics/Combination Diuretics Refill Protocol  Failed - 3/31/2019 11:44 PM       Failed - Visit with PCP or prescribing provider visit in past 12 months    Last office visit with prescriber/PCP: 11/9/2015 Milady Awan MD OR same dept: Visit date not found OR same specialty: 1/9/2017 Carmen Mauricio MD  Last physical: 2/27/2018 Last MTM visit: Visit date not found   Next visit within 3 mo: Visit date not found  Next physical within 3 mo: Visit date not found  Prescriber OR PCP: Milady Awan MD  Last diagnosis associated with med order: There are no diagnoses linked to this encounter.  If protocol passes may refill for 12 months if within 3 months of last provider visit (or a total of 15 months).            Failed - Serum Potassium in past 12 months     No results found for: LN-POTASSIUM         Failed - Serum Sodium in past 12 months     No results found for: LN-SODIUM         Failed - Blood pressure on file in past 12 months    BP Readings from Last 1 Encounters:   02/27/18 128/72            Failed - Serum Creatinine in past 12 months     Creatinine   Date Value Ref Range Status   02/27/2018 0.64 0.60 -  1.10 mg/dL Final

## 2021-05-29 NOTE — TELEPHONE ENCOUNTER
Refill Approved    Rx renewed per Medication Renewal Policy. Medication was last renewed on 2/28/19.    Dahlia Harmon, [facility] Connection Triage/Med Refill 5/29/2019     Requested Prescriptions   Pending Prescriptions Disp Refills     atorvastatin (LIPITOR) 10 MG tablet [Pharmacy Med Name: ATORVASTATIN 10MG TABLETS] 90 tablet 0     Sig: TAKE ONE TABLET BY MOUTH EVERY NIGHT AT BEDTIME       Statins Refill Protocol (Hmg CoA Reductase Inhibitors) Passed - 5/29/2019  3:38 AM        Passed - PCP or prescribing provider visit in past 12 months      Last office visit with prescriber/PCP: 11/9/2015 Milady Awan MD OR same dept: Visit date not found OR same specialty: 1/9/2017 Carmen Mauricio MD  Last physical: 4/15/2019 Last MTM visit: Visit date not found   Next visit within 3 mo: Visit date not found  Next physical within 3 mo: Visit date not found  Prescriber OR PCP: Milady Awan MD  Last diagnosis associated with med order: 1. Hyperlipemia  - atorvastatin (LIPITOR) 10 MG tablet [Pharmacy Med Name: ATORVASTATIN 10MG TABLETS]; TAKE ONE TABLET BY MOUTH EVERY NIGHT AT BEDTIME  Dispense: 90 tablet; Refill: 0    2. Lumbar radiculopathy  - gabapentin (NEURONTIN) 600 MG tablet [Pharmacy Med Name: GABAPENTIN 600MG TABLETS]; TAKE 1/2 TO 1 TABLET BY MOUTH EVERY NIGHT AT BEDTIME AS NEEDED  Dispense: 90 tablet; Refill: 0    If protocol passes may refill for 12 months if within 3 months of last provider visit (or a total of 15 months).             gabapentin (NEURONTIN) 600 MG tablet [Pharmacy Med Name: GABAPENTIN 600MG TABLETS] 90 tablet 0     Sig: TAKE 1/2 TO 1 TABLET BY MOUTH EVERY NIGHT AT BEDTIME AS NEEDED       Gabapentin/Levetiracetam/Tiagabine Refill Protocol  Passed - 5/29/2019  3:38 AM        Passed - PCP or prescribing provider visit in past 12 months or next 3 months     Last office visit with prescriber/PCP: 11/9/2015 Milady Awan MD OR same dept: Visit date not found OR same specialty: 1/9/2017 Hang  Carmen Mueller MD  Last physical: 4/15/2019 Last MTM visit: Visit date not found   Next visit within 3 mo: Visit date not found  Next physical within 3 mo: Visit date not found  Prescriber OR PCP: Milady Awan MD  Last diagnosis associated with med order: 1. Hyperlipemia  - atorvastatin (LIPITOR) 10 MG tablet [Pharmacy Med Name: ATORVASTATIN 10MG TABLETS]; TAKE ONE TABLET BY MOUTH EVERY NIGHT AT BEDTIME  Dispense: 90 tablet; Refill: 0    2. Lumbar radiculopathy  - gabapentin (NEURONTIN) 600 MG tablet [Pharmacy Med Name: GABAPENTIN 600MG TABLETS]; TAKE 1/2 TO 1 TABLET BY MOUTH EVERY NIGHT AT BEDTIME AS NEEDED  Dispense: 90 tablet; Refill: 0    If protocol passes may refill for 12 months if within 3 months of last provider visit (or a total of 15 months).

## 2021-05-30 VITALS — BODY MASS INDEX: 31.02 KG/M2 | WEIGHT: 158 LBS | HEIGHT: 60 IN

## 2021-05-30 NOTE — TELEPHONE ENCOUNTER
RN cannot approve Refill Request    RN can NOT refill this medication med is not covered by policy/route to provider.    Miguel Mcrae, [facility] Triage/Med Refill 7/9/2019    Requested Prescriptions   Pending Prescriptions Disp Refills     ergocalciferol (ERGOCALCIFEROL) 50,000 unit capsule [Pharmacy Med Name: VITAMIN D2 50,000IU (ERGO) CAP RX] 12 capsule 0     Sig: TAKE 1 CAPSULE BY MOUTH ONCE A WEEK FOR 12 DOSES       There is no refill protocol information for this order

## 2021-06-01 VITALS — WEIGHT: 169 LBS | BODY MASS INDEX: 33.18 KG/M2 | HEIGHT: 60 IN

## 2021-06-02 VITALS — BODY MASS INDEX: 32.79 KG/M2 | WEIGHT: 167 LBS | HEIGHT: 60 IN

## 2021-06-02 NOTE — TELEPHONE ENCOUNTER
RN cannot approve Refill Request    RN can NOT refill this medication med is not covered by policy/route to provider. Last office visit: 11/9/2015 Milady Awan MD Last Physical: 4/15/2019 Last MTM visit: Visit date not found Last visit same specialty: 1/9/2017 Carmen Mauricio MD.  Next visit within 3 mo: Visit date not found  Next physical within 3 mo: Visit date not found      Daniella Silverio, [facility] Triage/Med Refill 10/4/2019    Requested Prescriptions   Pending Prescriptions Disp Refills     ergocalciferol (ERGOCALCIFEROL) 50,000 unit capsule [Pharmacy Med Name: VITAMIN D2 50,000IU (ERGO) CAP RX] 12 capsule 0     Sig: TAKE 1 CAPSULE BY MOUTH ONCE A WEEK FOR 12 DOSES       There is no refill protocol information for this order

## 2021-06-04 ENCOUNTER — COMMUNICATION - HEALTHEAST (OUTPATIENT)
Dept: FAMILY MEDICINE | Facility: CLINIC | Age: 66
End: 2021-06-04

## 2021-06-04 VITALS
BODY MASS INDEX: 33.06 KG/M2 | WEIGHT: 164 LBS | SYSTOLIC BLOOD PRESSURE: 128 MMHG | TEMPERATURE: 97 F | HEIGHT: 59 IN | DIASTOLIC BLOOD PRESSURE: 60 MMHG | HEART RATE: 65 BPM | RESPIRATION RATE: 14 BRPM

## 2021-06-04 DIAGNOSIS — E55.9 VITAMIN D DEFICIENCY: ICD-10-CM

## 2021-06-04 NOTE — TELEPHONE ENCOUNTER
RN cannot approve Refill Request    RN can NOT refill this medication med is not covered by policy/route to provider. Last office visit: Visit date not found Last Physical: 4/15/2019 Last MTM visit: Visit date not found Last visit same specialty: 1/9/2017 Carmen Mauricio MD.  Next visit within 3 mo: Visit date not found  Next physical within 3 mo: Visit date not found      Ruthie Ignacio, [facility] Triage/Med Refill 12/29/2019    Requested Prescriptions   Pending Prescriptions Disp Refills     ergocalciferol (ERGOCALCIFEROL) 1,250 mcg (50,000 unit) capsule [Pharmacy Med Name: VITAMIN D2 50,000IU (ERGO) CAP RX] 12 capsule 0     Sig: TAKE 1 CAPSULE BY MOUTH ONCE A WEEK FOR 12 DOSES       There is no refill protocol information for this order

## 2021-06-07 NOTE — TELEPHONE ENCOUNTER
RN cannot approve Refill Request    RN can NOT refill this medication med is not covered by policy/route to provider. Last office visit: Visit date not found Last Physical: 4/15/2019 Last MTM visit: Visit date not found Last visit same specialty: 1/9/2017 Carmen Mauricio MD.  Next visit within 3 mo: Visit date not found  Next physical within 3 mo: Visit date not found      Ruthie Ignacio, [facility] Triage/Med Refill 4/1/2020    Requested Prescriptions   Pending Prescriptions Disp Refills     ergocalciferol (ERGOCALCIFEROL) 1,250 mcg (50,000 unit) capsule 12 capsule 0       There is no refill protocol information for this order

## 2021-06-07 NOTE — TELEPHONE ENCOUNTER
RN cannot approve Refill Request    RN can NOT refill this medication Protocol failed and NO refill given.       Dahlia Harmon, [facility] Connection Triage/Med Refill 4/24/2020    Requested Prescriptions   Pending Prescriptions Disp Refills     lisinopriL-hydrochlorothiazide (PRINZIDE,ZESTORETIC) 20-12.5 mg per tablet [Pharmacy Med Name: LISINOPRIL-HCTZ 20/12.5MG TABLETS] 90 tablet 3     Sig: TAKE 1 TABLET BY MOUTH DAILY       Diuretics/Combination Diuretics Refill Protocol  Failed - 4/24/2020  3:40 AM        Failed - Visit with PCP or prescribing provider visit in past 12 months     Last office visit with prescriber/PCP: Visit date not found OR same dept: Visit date not found OR same specialty: 1/9/2017 Carmen Mauricio MD  Last physical: 4/15/2019 Last MTM visit: Visit date not found   Next visit within 3 mo: Visit date not found  Next physical within 3 mo: Visit date not found  Prescriber OR PCP: Milady Awan MD  Last diagnosis associated with med order: 1. HTN (hypertension)  - lisinopriL-hydrochlorothiazide (PRINZIDE,ZESTORETIC) 20-12.5 mg per tablet [Pharmacy Med Name: LISINOPRIL-HCTZ 20/12.5MG TABLETS]; Take 1 tablet by mouth daily.  Dispense: 90 tablet; Refill: 3    If protocol passes may refill for 12 months if within 3 months of last provider visit (or a total of 15 months).             Failed - Serum Potassium in past 12 months      No results found for: LN-POTASSIUM          Failed - Serum Sodium in past 12 months      No results found for: LN-SODIUM          Failed - Blood pressure on file in past 12 months     BP Readings from Last 1 Encounters:   04/15/19 116/68             Failed - Serum Creatinine in past 12 months      Creatinine   Date Value Ref Range Status   04/15/2019 0.70 0.60 - 1.10 mg/dL Final

## 2021-06-08 NOTE — PROGRESS NOTES
"Assessment/Plan:    Dahlia Torres is a 61 y.o. female presenting for:    1. Cough  Most likely a viral bronchitis.  Discussed this with the patient.  Cough syrup with codeine was sent to the pharmacy.  We discussed risks and benefits of the medication.  If she is not improving or doing worse in the next few days and encouraged her to let me know and we can consider some antibiotics however we discussed that this was likely not be very efficacious at this point.  She plans to see how things go over the next few days particularly when she is sleeping well and she will let me know.  - codeine-guaiFENesin (GUAIFENESIN AC)  mg/5 mL liquid; Take 5 mL by mouth 3 (three) times a day as needed for cough.  Dispense: 120 mL; Refill: 0  - azithromycin (ZITHROMAX Z-EUGENE) 250 MG tablet; Take 2 tablets (500 mg) on  Day 1,  followed by 1 tablet (250 mg) once daily on Days 2 through 5.  Dispense: 6 tablet; Refill: 0        There are no discontinued medications.        Chief Complaint:  Chief Complaint   Patient presents with     Cough     x 1 week.       Subjective:   Dahlia Torres is a very pleasant 61-year-old female presenting to the clinic today with concerns over 1 week of a cold.  She states that this originally began with nasal congestion but now this has \"settled in my chest.\"  She notes that she has had a nonproductive hacking type cough.  She states that she feels short of breath with coughing but not between coughing episodes.  She is eating and drinking well.  She states that she has had to sleep in a elevated position due to the coughing.  She does not feel short of breath when she lays flat but it just makes her cough worse.    Her  was recently diagnosed with pneumonia and started on antibiotics.  She had a low-grade fever yesterday but none today.    12 point review of systems completed and negative except for what has been described above    History   Smoking Status     Never Smoker   Smokeless Tobacco "     Not on file       Current Outpatient Prescriptions   Medication Sig Note     atorvastatin (LIPITOR) 10 MG tablet Take 1 tablet (10 mg total) by mouth bedtime.      calcium citrate-vitamin D (CITRACAL+D) 315-200 mg-unit per tablet Take 1 tablet by mouth daily.      ergocalciferol (ERGOCALCIFEROL) 50,000 unit capsule TK 1 C PO ONCE A WEEK FOR 12 DOSES 1/9/2017: Received from: External Pharmacy     gabapentin (NEURONTIN) 600 MG tablet Take 0.5-1 tablets (300-600 mg total) by mouth bedtime as needed.      lisinopril (PRINIVIL,ZESTRIL) 20 MG tablet TAKE 1 TABLET BY MOUTH EVERY DAY      pantoprazole (PROTONIX) 40 MG tablet TAKE 1 TABLET BY MOUTH EVERY DAY      azithromycin (ZITHROMAX Z-EUGENE) 250 MG tablet Take 2 tablets (500 mg) on  Day 1,  followed by 1 tablet (250 mg) once daily on Days 2 through 5.      codeine-guaiFENesin (GUAIFENESIN AC)  mg/5 mL liquid Take 5 mL by mouth 3 (three) times a day as needed for cough.          Objective:  Vitals:    01/09/17 1113   BP: 122/64   Pulse: 80   Resp: 20   Temp: 98.2  F (36.8  C)   TempSrc: Oral   SpO2: 97%   Weight: 158 lb (71.7 kg)   Height: 5' (1.524 m)       Vital signs reviewed and stable  General: No acute distress  Psych: Appropriate affect  HEENT: moist mucous membranes, pupils equal, round, reactive to light and accomodation, posterior oropharynx clear of erythema or exudate, tympanic membranes are pearly grey bilaterally  Lymph: no cervical or supraclavicular lymphadenopathy  Cardiovascular: regular rate and rhythm with no murmur  Pulmonary: clear to auscultation bilaterally with no wheeze  Abdomen: soft, non tender, non distended with normo-active bowel sounds  Extremities: warm and well perfused with no edema  Skin: warm and dry with no rash         This note has been dictated and transcribed using voice recognition software.   Any errors in transcription are unintentional and inherent to the software.

## 2021-06-08 NOTE — TELEPHONE ENCOUNTER
RN cannot approve Refill Request    RN can NOT refill this medication PCP messaged that patient is overdue for Office Visit. Last office visit: Visit date not found Last Physical: 4/15/2019 Last MTM visit: Visit date not found Last visit same specialty: 1/9/2017 Carmen Mauricio MD.  Next visit within 3 mo: Visit date not found  Next physical within 3 mo: Visit date not found      [address], [facility] Triage/Med Refill 5/25/2020    Requested Prescriptions   Pending Prescriptions Disp Refills     atorvastatin (LIPITOR) 10 MG tablet [Pharmacy Med Name: ATORVASTATIN 10MG TABLETS] 90 tablet 3     Sig: TAKE ONE TABLET BY MOUTH EVERY NIGHT AT BEDTIME       Statins Refill Protocol (Hmg CoA Reductase Inhibitors) Failed - 5/24/2020  3:40 AM        Failed - PCP or prescribing provider visit in past 12 months      Last office visit with prescriber/PCP: Visit date not found OR same dept: Visit date not found OR same specialty: 1/9/2017 Carmen Mauricio MD  Last physical: 4/15/2019 Last MTM visit: Visit date not found   Next visit within 3 mo: Visit date not found  Next physical within 3 mo: Visit date not found  Prescriber OR PCP: Milady Awan MD  Last diagnosis associated with med order: 1. Hyperlipemia  - atorvastatin (LIPITOR) 10 MG tablet [Pharmacy Med Name: ATORVASTATIN 10MG TABLETS]; TAKE ONE TABLET BY MOUTH EVERY NIGHT AT BEDTIME  Dispense: 90 tablet; Refill: 3    2. Lumbar radiculopathy  - gabapentin (NEURONTIN) 600 MG tablet [Pharmacy Med Name: GABAPENTIN 600MG TABLETS]; TAKE 1/2 TO 1 TABLET BY MOUTH EVERY NIGHT AT BEDTIME AS NEEDED  Dispense: 90 tablet; Refill: 3    If protocol passes may refill for 12 months if within 3 months of last provider visit (or a total of 15 months).                gabapentin (NEURONTIN) 600 MG tablet [Pharmacy Med Name: GABAPENTIN 600MG TABLETS] 90 tablet 3     Sig: TAKE 1/2 TO 1 TABLET BY MOUTH EVERY NIGHT AT BEDTIME AS NEEDED        Gabapentin/Levetiracetam/Tiagabine Refill Protocol  Failed - 5/24/2020  3:40 AM        Failed - PCP or prescribing provider visit in past 12 months or next 3 months     Last office visit with prescriber/PCP: Visit date not found OR same dept: Visit date not found OR same specialty: 1/9/2017 Carmen Mauricio MD  Last physical: 4/15/2019 Last MTM visit: Visit date not found   Next visit within 3 mo: Visit date not found  Next physical within 3 mo: Visit date not found  Prescriber OR PCP: Milady Awan MD  Last diagnosis associated with med order: 1. Hyperlipemia  - atorvastatin (LIPITOR) 10 MG tablet [Pharmacy Med Name: ATORVASTATIN 10MG TABLETS]; TAKE ONE TABLET BY MOUTH EVERY NIGHT AT BEDTIME  Dispense: 90 tablet; Refill: 3    2. Lumbar radiculopathy  - gabapentin (NEURONTIN) 600 MG tablet [Pharmacy Med Name: GABAPENTIN 600MG TABLETS]; TAKE 1/2 TO 1 TABLET BY MOUTH EVERY NIGHT AT BEDTIME AS NEEDED  Dispense: 90 tablet; Refill: 3    If protocol passes may refill for 12 months if within 3 months of last provider visit (or a total of 15 months).

## 2021-06-09 NOTE — TELEPHONE ENCOUNTER
RN cannot approve Refill Request    RN can NOT refill this medication med is not covered by policy/route to provider. Last office visit: Visit date not found Last Physical: 4/15/2019 Last MTM visit: Visit date not found Last visit same specialty: 1/9/2017 Carmen Mauricio MD.  Next visit within 3 mo: Visit date not found  Next physical within 3 mo: Visit date not found      [facility] [address], [facility] Triage/Med Refill 6/21/2020    Requested Prescriptions   Pending Prescriptions Disp Refills     ergocalciferol (ERGOCALCIFEROL) 1,250 mcg (50,000 unit) capsule [Pharmacy Med Name: VITAMIN D2 50,000IU (ERGO) CAP RX] 12 capsule 0     Sig: TAKE 1 CAPSULE BY MOUTH ONCE A WEEK       There is no refill protocol information for this order

## 2021-06-10 NOTE — TELEPHONE ENCOUNTER
Who is calling:  Patient   Reason for Call:  Caller stated that she is out and needing medication now. Caller stated that she should not have to wait this long for a medication to be sent over.   Date of last appointment with primary care:   Okay to leave a detailed message: Yes

## 2021-06-10 NOTE — TELEPHONE ENCOUNTER
Dr. Mauricio, it looks like this refill was sent to both you and Dr. Awan earlier today automatically?   I have forwarded the refill task to you again in case you didn't receive?   Are you able to fill in PCP absence as per below Mychart pt needs rx now.   Thank you

## 2021-06-10 NOTE — TELEPHONE ENCOUNTER
I sent a 30-day supply.  I have not seen this refill previously so I am unsure why she believes it was sent to me?    Dr. Chavarria is covering [facility] today so I have not been checking it.    This patient needs an appointment.  I sent a 30-day supply but she should make a virtual visit or in person visit with Dr. Awan.  Dr. Awan is virtual next week and I am sure would be happy to speak with her.    BB

## 2021-06-10 NOTE — TELEPHONE ENCOUNTER
Garrett Awan:  I forgot to tell you last week that I needed a refill on my Lisinopril/HCTZ.  Can you process this for me?  I have enough medication until Monday.  Thank  Chiara Torres

## 2021-06-10 NOTE — PROGRESS NOTES
Assessment and Plan:       1. Routine general medical examination at a health care facility  She presents for her yearly physical.  She does not have Medicare as she is still working.  As far as healthcare maintenance, she is due for her mammogram.  She is up-to-date on colonoscopy and bone density.  We will do fasting blood work today.  She is due for her second shingles and Prevnar 13 today.  We will do Pneumovax next year.  - Pneumococcal conjugate vaccine 13-valent 6wks-17yrs; >50yrs  - Varicella Zoster, Recombinant Vaccine IM  - Comprehensive Metabolic Panel  - HM2(CBC w/o Differential)  - Lipid Cascade  - Thyroid Stimulating Hormone (TSH)  - Vitamin D, Total (25-Hydroxy)    2. Hypercholesteremia  This has been stable on 10 mg of Lipitor.  - Lipid Cascade    3. Essential hypertension  Under good control with her current medications.  She will monitor at work and let me know if she thinks it is elevated.    4. Lumbar radiculopathy  Using gabapentin at night.    The patient's current medical problems were reviewed.      The following health maintenance schedule was reviewed with the patient and provided in printed form in the after visit summary:   Health Maintenance   Topic Date Due     HEPATITIS C SCREENING  1955     ADVANCE CARE PLANNING  09/16/2014     PNEUMOCOCCAL IMMUNIZATION 65+ LOW/MEDIUM RISK (1 of 2 - PCV13) 06/04/2019     FALL RISK ASSESSMENT  06/04/2019     ZOSTER VACCINES (2 of 2) 06/10/2019     MEDICARE ANNUAL WELLNESS VISIT  04/15/2020     INFLUENZA VACCINE RULE BASED (1) 08/01/2020     TD 18+ HE  02/22/2021     MAMMOGRAM  03/29/2021     DXA SCAN  04/22/2021     LIPID  04/15/2024     COLORECTAL CANCER SCREENING  05/14/2029     HEPATITIS B VACCINES  Aged Out        Subjective:   Chief Complaint: Dahlia Torres is an 65 y.o. female here for an Annual Wellness visit.   HPI: Dahlia comes in today for her yearly exam.  She does not have Medicare as she is still working.  She overall is doing  well.  She has a slight cough from lisinopril, but does not feel it is bad enough to switch medications.  She still struggles with some osteoarthritis.  She has some neuropathy in her legs for which she takes gabapentin at bedtime.  As far as healthcare maintenance, she had a normal Pap and a stable bone density in 2018.  She is up-to-date on colonoscopy.  She is due for her mammogram.  She is also due for her second Zostavax and will start her pneumonia series.    Review of Systems:    Please see above.  The rest of the review of systems are negative for all systems.    Patient Care Team:  Milady Awan MD as PCP - General  Milady Awan MD as Assigned PCP     Patient Active Problem List   Diagnosis     Hypercholesteremia     Osteopenia     Asymptomatic Postmenopausal Status     Osteoarthritis Of The Knee     Imaging Studies Nonspecific Abnormal Findings Breast     Degenerative arthritis of right knee     Lumbar radiculopathy     HTN (hypertension)     GERD (gastroesophageal reflux disease)     Past Medical History:   Diagnosis Date     GERD (gastroesophageal reflux disease)      Hyperlipidemia      Osteoarthritis      PONV (postoperative nausea and vomiting)       No past surgical history on file.   No family history on file.   Social History     Socioeconomic History     Marital status:      Spouse name: Not on file     Number of children: Not on file     Years of education: Not on file     Highest education level: Not on file   Occupational History     Not on file   Social Needs     Financial resource strain: Not on file     Food insecurity     Worry: Not on file     Inability: Not on file     Transportation needs     Medical: Not on file     Non-medical: Not on file   Tobacco Use     Smoking status: Never Smoker     Smokeless tobacco: Never Used   Substance and Sexual Activity     Alcohol use: No     Drug use: Not on file     Sexual activity: Not on file   Lifestyle     Physical activity     Days per  "week: Not on file     Minutes per session: Not on file     Stress: Not on file   Relationships     Social connections     Talks on phone: Not on file     Gets together: Not on file     Attends Mandaeism service: Not on file     Active member of club or organization: Not on file     Attends meetings of clubs or organizations: Not on file     Relationship status: Not on file     Intimate partner violence     Fear of current or ex partner: Not on file     Emotionally abused: Not on file     Physically abused: Not on file     Forced sexual activity: Not on file   Other Topics Concern     Not on file   Social History Narrative     Not on file      Current Outpatient Medications   Medication Sig Dispense Refill     atorvastatin (LIPITOR) 10 MG tablet TAKE ONE TABLET BY MOUTH EVERY NIGHT AT BEDTIME 90 tablet 3     calcium citrate-vitamin D (CITRACAL+D) 315-200 mg-unit per tablet Take 1 tablet by mouth daily.       ergocalciferol (ERGOCALCIFEROL) 1,250 mcg (50,000 unit) capsule TAKE 1 CAPSULE BY MOUTH ONCE A WEEK 12 capsule 0     gabapentin (NEURONTIN) 600 MG tablet TAKE 1/2 TO 1 TABLET BY MOUTH EVERY NIGHT AT BEDTIME AS NEEDED 90 tablet 3     lisinopriL-hydrochlorothiazide (PRINZIDE,ZESTORETIC) 20-12.5 mg per tablet TAKE 1 TABLET BY MOUTH DAILY 30 tablet 0     No current facility-administered medications for this visit.       Objective:   Vital Signs:   Visit Vitals  /60   Pulse 65   Temp 97  F (36.1  C) (Oral)   Resp 14   Ht 4' 11\" (1.499 m)   Wt 164 lb (74.4 kg)   BMI 33.12 kg/m           VisionScreening:  No exam data present     PHYSICAL EXAM    Physical Exam:  General Appearance: Alert, cooperative, no distress, appears stated age  Head: Normocephalic, without obvious abnormality, atraumatic  Eyes: PERRL, conjunctiva/corneas clear, EOM's intact  Ears: Normal TM's and external ear canals, both ears  Nose: Nares normal, septum midline,mucosa normal, no drainage  Throat: Lips, mucosa, and tongue normal; teeth and " gums normal  Neck: Supple, symmetrical, trachea midline, no adenopathy; thyroid: not enlarged, symmetric, no tenderness/mass/nodules; no carotid bruit  Back: Symmetric, no curvature, ROM normal, no CVA tenderness  Lungs: Clear to auscultation bilaterally, respirations unlabored  Breasts: No breast masses, tenderness, asymmetry, or nipple discharge  Heart: Regular rate and rhythm, S1 and S2 normal, no murmur, rub, or gallop, Abdomen: Soft, non-tender, bowel sounds active all four quadrants,  no masses, no organomegaly  Extremities: Extremities normal, atraumatic, no cyanosis or edema  Skin: Skin color, texture, turgor normal, no rashes or lesions  Lymph nodes: Cervical, supraclavicular, and axillary nodes normal  Neurologic: Normal          Assessment Results 8/20/2020   Activities of Daily Living No help needed   Instrumental Activities of Daily Living No help needed   Mini Cog Total Score 5   Some recent data might be hidden     A Mini-Cog score of 0-2 suggests the possibility of dementia, score of 3-5 suggests no dementia  Both cognitive assessment and falls risk were performed, scanned to the flow sheets, normal    Identified Health Risks:     She is at risk for lack of exercise and has been provided with information to increase physical activity for the benefit of her well-being.  Information regarding advance directives (living lund), including where she can download the appropriate form, was provided to the patient via the AVS.

## 2021-06-10 NOTE — TELEPHONE ENCOUNTER
RN cannot approve Refill Request    RN can NOT refill this medication PCP messaged that patient is overdue for Labs and Office Visit. Last office visit: Visit date not found Last Physical: 4/15/2019 Last MTM visit: Visit date not found Last visit same specialty: 1/9/2017 Carmen Mauricio MD.  Next visit within 3 mo: Visit date not found  Next physical within 3 mo: Visit date not found      [facility], [facility] Triage/Med Refill 8/1/2020    Requested Prescriptions   Pending Prescriptions Disp Refills     lisinopriL-hydrochlorothiazide (PRINZIDE,ZESTORETIC) 20-12.5 mg per tablet [Pharmacy Med Name: LISINOPRIL-HCTZ 20/12.5MG TABLETS] 30 tablet 0     Sig: TAKE 1 TABLET BY MOUTH DAILY       Diuretics/Combination Diuretics Refill Protocol  Failed - 7/30/2020  3:47 PM        Failed - Visit with PCP or prescribing provider visit in past 12 months     Last office visit with prescriber/PCP: Visit date not found OR same dept: Visit date not found OR same specialty: 1/9/2017 Carmen Mauricio MD  Last physical: 4/15/2019 Last MTM visit: Visit date not found   Next visit within 3 mo: Visit date not found  Next physical within 3 mo: Visit date not found  Prescriber OR PCP: Milady Awan MD  Last diagnosis associated with med order: 1. HTN (hypertension)  - lisinopriL-hydrochlorothiazide (PRINZIDE,ZESTORETIC) 20-12.5 mg per tablet [Pharmacy Med Name: LISINOPRIL-HCTZ 20/12.5MG TABLETS]; TAKE 1 TABLET BY MOUTH DAILY  Dispense: 30 tablet; Refill: 0    If protocol passes may refill for 12 months if within 3 months of last provider visit (or a total of 15 months).             Failed - Serum Potassium in past 12 months      No results found for: LN-POTASSIUM          Failed - Serum Sodium in past 12 months      No results found for: LN-SODIUM          Failed - Blood pressure on file in past 12 months     BP Readings from Last 1 Encounters:   04/15/19 116/68             Failed - Serum Creatinine in past 12  months      Creatinine   Date Value Ref Range Status   04/15/2019 0.70 0.60 - 1.10 mg/dL Final

## 2021-06-11 NOTE — TELEPHONE ENCOUNTER
RN cannot approve Refill Request    RN can NOT refill this medication med is not covered by policy/route to provider. Last office visit: Visit date not found Last Physical: 8/20/2020 Last MTM visit: Visit date not found Last visit same specialty: 1/9/2017 Carmen Mauricio MD.  Next visit within 3 mo: Visit date not found  Next physical within 3 mo: Visit date not found      [facility] Triage/Med Refill 9/19/2020    Requested Prescriptions   Pending Prescriptions Disp Refills     ergocalciferol (ERGOCALCIFEROL) 1,250 mcg (50,000 unit) capsule [Pharmacy Med Name: VITAMIN D2 50,000IU (ERGO) CAP RX] 12 capsule 0     Sig: TAKE ONE CAPSULE BY MOUTH ONCE WEEKLY       There is no refill protocol information for this order

## 2021-06-13 NOTE — TELEPHONE ENCOUNTER
RN cannot approve Refill Request    RN can NOT refill this medication med is not covered by policy/route to provider. Last office visit: Visit date not found Last Physical: 8/20/2020 Last MTM visit: Visit date not found Last visit same specialty: 1/9/2017 Carmen Mauricio MD.  Next visit within 3 mo: Visit date not found  Next physical within 3 mo: Visit date not found      Dahlia Harmon, [facility] Triage/Med Refill 12/17/2020    Requested Prescriptions   Pending Prescriptions Disp Refills     ergocalciferol (ERGOCALCIFEROL) 1,250 mcg (50,000 unit) capsule 12 capsule 0     Sig: Take 1 capsule (50,000 Units total) by mouth once a week.       There is no refill protocol information for this order

## 2021-06-14 NOTE — TELEPHONE ENCOUNTER
RN cannot approve Refill Request    RN can NOT refill this medication med is not covered by policy/route to provider. Last office visit: Visit date not found Last Physical: 8/20/2020 Last MTM visit: Visit date not found Last visit same specialty: 1/9/2017 Carmen Mauricio MD.  Next visit within 3 mo: Visit date not found  Next physical within 3 mo: Visit date not found      Ruthie Ignacio, [facility] Triage/Med Refill 12/22/2020    Requested Prescriptions   Pending Prescriptions Disp Refills     ergocalciferol (ERGOCALCIFEROL) 1,250 mcg (50,000 unit) capsule [Pharmacy Med Name: VITAMIN D2 50,000IU (ERGO) CAP RX] 12 capsule 0     Sig: TAKE ONE CAPSULE BY MOUTH ONCE WEEKLY       There is no refill protocol information for this order

## 2021-06-15 NOTE — TELEPHONE ENCOUNTER
RN cannot approve Refill Request    RN can NOT refill this medication med is not covered by policy/route to provider. Last office visit: Visit date not found Last Physical: 8/20/2020 Last MTM visit: Visit date not found Last visit same specialty: Visit date not found.  Next visit within 3 mo: Visit date not found  Next physical within 3 mo: Visit date not found      Veronika Epperson, [facility] Triage/Med Refill 3/9/2021    Requested Prescriptions   Pending Prescriptions Disp Refills     VITAMIN D2 1,250 mcg (50,000 unit) capsule [Pharmacy Med Name: VITAMIN D (ERGOCALCIFERO 50,000 CAPS] 12 capsule 0     Sig: TAKE ONE CAPSULE BY MOUTH ONCE WEEKLY       There is no refill protocol information for this order

## 2021-06-15 NOTE — TELEPHONE ENCOUNTER
Refill Approved    Rx renewed per Medication Renewal Policy. Medication was last renewed on 08/28/2020.  Last office visit was 08/20/2020 with PCP.    Evy Conrad, [facility] Triage/Med Refill 2/20/2021     Requested Prescriptions   Pending Prescriptions Disp Refills     lisinopriL-hydrochlorothiazide (PRINZIDE,ZESTORETIC) 20-12.5 mg per tablet [Pharmacy Med Name: LISINOPRIL-HYDROCHLORO 20-12.5 TABS] 60 tablet 0     Sig: TAKE ONE TABLET BY MOUTH ONCE DAILY       Diuretics/Combination Diuretics Refill Protocol  Passed - 2/20/2021  5:01 AM        Passed - Visit with PCP or prescribing provider visit in past 12 months     Last office visit with prescriber/PCP: Visit date not found OR same dept: Visit date not found OR same specialty: Visit date not found  Last physical: 8/20/2020 Last MTM visit: Visit date not found   Next visit within 3 mo: Visit date not found  Next physical within 3 mo: Visit date not found  Prescriber OR PCP: Milady Awan MD  Last diagnosis associated with med order: 1. HTN (hypertension)  - lisinopriL-hydrochlorothiazide (PRINZIDE,ZESTORETIC) 20-12.5 mg per tablet [Pharmacy Med Name: LISINOPRIL-HYDROCHLORO 20-12.5 TABS]; TAKE ONE TABLET BY MOUTH ONCE DAILY  Dispense: 60 tablet; Refill: 0    If protocol passes may refill for 12 months if within 3 months of last provider visit (or a total of 15 months).             Passed - Serum Potassium in past 12 months      Lab Results   Component Value Date    Potassium 4.0 08/20/2020             Passed - Serum Sodium in past 12 months      Lab Results   Component Value Date    Sodium 141 08/20/2020             Passed - Blood pressure on file in past 12 months     BP Readings from Last 1 Encounters:   08/20/20 128/60             Passed - Serum Creatinine in past 12 months      Creatinine   Date Value Ref Range Status   08/20/2020 0.73 0.60 - 1.10 mg/dL Final

## 2021-06-16 NOTE — PROGRESS NOTES
Assessment/ Plan     1. Routine general medical examination at a health care facility  As far as healthcare maintenance, she will need a Pap smear in 2019.  She will be due for mammogram in May and a bone density in June.  She is up-to-date on immunizations.  She would like to do fasting blood work today.  We talked a bit about weight loss and she might look into the [employer] program or do something on her own.  - Comprehensive Metabolic Panel  - Glycosylated Hemoglobin A1c  - HM2(CBC w/o Differential)  - Lipid Cascade  - Thyroid Stimulating Hormone (TSH)  - Vitamin D, Total (25-Hydroxy)    2. Visit for screening mammogram  Patient will be due this summer.    3. HTN (hypertension)  Patient's blood pressure has been under excellent control with her current medication.    4. Hypercholesteremia  Patient's cholesterol has been under decent control with her current medications.    5. Lumbar radiculopathy  Patient is having a little bit worsening of her radiculopathy she thinks likely due to weight gain.  She does have a physical job as well.  She is just using her gabapentin as needed at bedtime.    6. Osteopenia  Patient's last bone density have a high fracture risk so we had discussed starting Fosamax.  She wanted to give it some time to make sure she is getting adequate calcium, vitamin D, and weightbearing exercise.  She admits she has not been as good about the exercise.  She will be due to check this summer.  We did talk about Fosamax and potential side effects and she is willing to start it if there is a decline or no improvement with her bone density.  She will send me an email the summer and output in the order for bone density.  - Vitamin D, Total (25-Hydroxy)    7. Fatigue  Patient is having some nonspecific fatigue.  This is mostly during the work week and is better on the weekends.  She is a very physical job in the ICU at [employer] I think it is likely related to that.  - HM2(CBC w/o Differential)  -  Thyroid Stimulating Hormone (TSH)    Subjective:     Dahlia Torres is a 62 y.o. female who presents for an annual exam.  Overall, she is doing quite well.  She states she does have some fatigue during the week after work.  She states she is usually in bed by 8:00 at night.  She is fine on the weekends when she can sleep and a little bit longer.  She is not having any sleep apnea symptoms.  She does admit that she has gained a little bit of weight in the last year or so.  She has not been as good about doing regular exercise.  She does not have a sedentary job however.  She is always moving and often lifting patients.  We discussed her bone density that she had in 2016.  This had high fracture risk but she wanted to hold off on the Fosamax and try doing calcium, vitamin D, and exercise.  She admits she has not been doing regular exercise outside of work.  Both her mother and grandmother had osteoporosis.  She is taking weekly vitamin D.  She is okay with starting Fosamax after her next bone density if there is any decline or no improvement.  We went over potential side effects.  She has had a little bit of worsening of her radiculopathy.  She thinks this may be partly due to some weight gain but she is very active at work as well.  The Neurontin really does help with that.  We did discuss the [employer] weight loss program and she may look into it or into something on her only weight watchers.    Healthy Habits:   Regular Exercise: Yes and No  Sunscreen Use: Yes  Healthy Diet: Yes  Dental Visits Regularly: Yes  Seat Belt: Yes  Sexually active: Yes  Self Breast Exam Monthly:Yes  Colonoscopy: Yes  Lipid Profile: Yes  Glucose Screen: Yes  Prevention of Osteoporosis: Yes  Last Dexa: Yes        Immunization History   Administered Date(s) Administered     Influenza, inj, historic,unspecified 11/01/2014     Tdap 02/22/2011     Immunization status: up to date and documented.     Gynecologic History  No LMP recorded. Patient  is postmenopausal.  Contraception: none  Last Pap: . Results were: normal  Last mammogram: . Results were: normal      OB History    Para Term  AB Living   2 2 2      SAB TAB Ectopic Multiple Live Births             # Outcome Date GA Lbr Bryan/2nd Weight Sex Delivery Anes PTL Lv   2 Term            1 Term                   Current Outpatient Prescriptions   Medication Sig Dispense Refill     atorvastatin (LIPITOR) 10 MG tablet TAKE ONE TABLET BY MOUTH EVERY NIGHT AT BEDTIME 90 tablet 0     calcium citrate-vitamin D (CITRACAL+D) 315-200 mg-unit per tablet Take 1 tablet by mouth daily.       ergocalciferol (ERGOCALCIFEROL) 50,000 unit capsule TAKE 1 CAPSULE BY MOUTH ONCE A WEEK FOR 12 DOSES 12 capsule 3     gabapentin (NEURONTIN) 600 MG tablet TAKE ONE-HALF TO ONE TABLET BY MOUTH EVERY NIGHT AT BEDTIME AS NEEDED 90 tablet 0     lisinopril (PRINIVIL,ZESTRIL) 20 MG tablet TAKE 1 TABLET BY MOUTH EVERY DAY 90 tablet 3     No current facility-administered medications for this visit.      Past Medical History:   Diagnosis Date     GERD (gastroesophageal reflux disease)      Hyperlipidemia      Osteoarthritis      PONV (postoperative nausea and vomiting)      No past surgical history on file.  Review of patient's allergies indicates no known allergies.  No family history on file.  Social History     Social History     Marital status:      Spouse name: N/A     Number of children: N/A     Years of education: N/A     Occupational History     Not on file.     Social History Main Topics     Smoking status: Never Smoker     Smokeless tobacco: Never Used     Alcohol use No     Drug use: Not on file     Sexual activity: Not on file     Other Topics Concern     Not on file     Social History Narrative       Review of Systems  General:  Denies problems  Eyes:  Denies problems  Ears/Nose/Throat:  Denies problems  Cardiovascular:  Denies problems  Respiratory:  Denies problems  Gastrointestinal:  Denies  "problems  Genitourinary:  Denies problems  Musculoskeletal:  Denies problems  Skin:  Denies problems  Neurologic:  Denies problems  Psychiatric:  Denies problems  Endocrine:  Denies problems  Heme/Lymphatic:  Denies problems  Allergic/Immunologic:  Denies problems    Objective:      Vitals:    02/27/18 0905   BP: 128/72   Pulse: 62   Resp: 12   Temp: 98.2  F (36.8  C)   TempSrc: Oral   Weight: 169 lb (76.7 kg)   Height: 4' 11.5\" (1.511 m)       Physical Exam:  General Appearance: Alert, cooperative, no distress, appears stated age  Head: Normocephalic, without obvious abnormality, atraumatic  Eyes: PERRL, conjunctiva/corneas clear, EOM's intact  Ears: Normal TM's and external ear canals, both ears  Nose: Nares normal, septum midline,mucosa normal, no drainage  Throat: Lips, mucosa, and tongue normal; teeth and gums normal  Neck: Supple, symmetrical, trachea midline, no adenopathy; thyroid: not enlarged, symmetric, no tenderness/mass/nodules; no carotid bruit  Back: Symmetric, no curvature, ROM normal, no CVA tenderness  Lungs: Clear to auscultation bilaterally, respirations unlabored  Breasts: No breast masses, tenderness, asymmetry, or nipple discharge  Heart: Regular rate and rhythm, S1 and S2 normal, no murmur, rub, or gallop, Abdomen: Soft, non-tender, bowel sounds active all four quadrants,  no masses, no organomegaly  Extremities: Extremities normal, atraumatic, no cyanosis or edema  Skin: Skin color, texture, turgor normal, no rashes or lesions  Lymph nodes: Cervical, supraclavicular, and axillary nodes normal  Neurologic: Normal        The following high BMI interventions were performed this visit: encouragement to exercise and dietary needs education    Results for orders placed or performed in visit on 02/27/18   HM2(CBC w/o Differential)   Result Value Ref Range    WBC 6.0 4.0 - 11.0 thou/uL    RBC 4.49 3.80 - 5.40 mill/uL    Hemoglobin 13.4 12.0 - 16.0 g/dL    Hematocrit 39.9 35.0 - 47.0 %    MCV 89 80 - " 100 fL    MCH 29.9 27.0 - 34.0 pg    MCHC 33.6 32.0 - 36.0 g/dL    RDW 12.6 11.0 - 14.5 %    Platelets 254 140 - 440 thou/uL    MPV 7.2 7.0 - 10.0 fL       Milady Awan MD    This note has been dictated using voice recognition software. Any grammatical or context distortions are unintentional and inherent to the software

## 2021-06-16 NOTE — TELEPHONE ENCOUNTER
Spoke with You - Atorvastatin was transferred to their clinic with 2 refills left on it.  They are requesting 90 day supply.  She won't be due for labs/CPE until August 2021. I gave verbal ok for 90 days with 1 refill per Dr. Awan.

## 2021-06-18 NOTE — PATIENT INSTRUCTIONS - HE
Patient Instructions by Milady Awan MD at 8/20/2020 11:20 AM     Author: Milady Awan MD Service: -- Author Type: Physician    Filed: 8/20/2020 12:01 PM Encounter Date: 8/20/2020 Status: Signed    : Milady Awan MD (Physician)         Patient Education     Exercise for a Healthier Heart  You may wonder how you can improve the health of your heart. If youre thinking about exercise, youre on the right track. You dont need to become an athlete, but you do need a certain amount of brisk exercise to help strengthen your heart. If you have been diagnosed with a heart condition, your doctor may recommend exercise to help stabilize your condition. To help make exercise a habit, choose safe, fun activities.       Be sure to check with your health care provider before starting an exercise program.    Why exercise?  Exercising regularly offers many healthy rewards. It can help you do all of the following:    Improve your blood cholesterol levels to help prevent further heart trouble    Lower your blood pressure to help prevent a stroke or heart attack    Control diabetes, or reduce your risk of getting this disease    Improve your heart and lung function    Reach and maintain a healthy weight    Make your muscles stronger and more limber so you can stay active    Prevent falls and fractures by slowing the loss of bone mass (osteoporosis)    Manage stress better  Exercise tips  Ease into your routine. Set small goals. Then build on them.  Exercise on most days. Aim for a total of 150 or more minutes of moderate to  vigorous intensity activity each week. Consider 40 minutes, 3 to 4 times a week. For best results, activity should last for 40 minutes on average. It is OK to work up to the 40 minute period over time. Examples of moderate-intensity activity is walking one mile in 15 minutes or 30 to 45 minutes of yard work.  Step up your daily activity level. Along with your exercise program, try being more active  throughout the day. Walk instead of drive. Do more household tasks or yard work.  Choose one or more activities you enjoy. Walking is one of the easiest things you can do. You can also try swimming, riding a bike, or taking an exercise class.  Stop exercising and call your doctor if you:    Have chest pain or feel dizzy or lightheaded    Feel burning, tightness, pressure, or heaviness in your chest, neck, shoulders, back, or arms    Have unusual shortness of breath    Have increased joint or muscle pain    Have palpitations or an irregular heartbeat      [id number] [employer]. [address] [place] [address]. All rights reserved. This information is not intended as a substitute for professional medical care. Always follow your healthcare professional's instructions.         Patient Education   Understanding Advance Care Planning  Advance care planning is the process of deciding ones own future medical care. It helps ensure that if you cant speak for yourself, your wishes can still be carried out. The plan is a series of legal documents that note a persons wishes. The documents vary by state. Advance care planning may be done when a person has a serious illness that is expected to get worse. It may be done before major surgery. And it can help you and your family be prepared in case of a major illness or injury. Advance care planning helps with making decisions at these times.       A health care proxy is a person who acts as the voice of a patient when the patient cant speak for himself or herself. The name of this role varies by state. It may be called a Durable Medical Power of  or Durable Power of  for Healthcare. It may be called an agent, surrogate, or advocate. Or it may be called a representative or decision maker. It is an official duty that is identified by a legal document. The document also varies by state.    Why Is Advance Care Planning Important?  If a person  communicates their healthcare wishes:    They will be given medical care that matches their values and goals.    Their family members will not be forced to make decisions in a crisis with no guidance.  Creating a Plan  Making an advance care plan is often done in 3 steps:    Thinking about ones wishes. To create an advance care plan, you should think about what kind of medical treatment you would want if you lose the ability to communicate. Are there any situations in which you would refuse or stop treatment? Are there therapies you would want or not want? And whom do you want to make decisions for you? There are many places to learn more about how to plan for your care. Ask your doctor or  for resources.    Picking a health care proxy. This means choosing a trusted person to speak for you only when you cant speak for yourself. When you cannot make medical decisions, your proxy makes sure the instructions in your advance care plan are followed. A proxy does not make decisions based on his or her own opinions. They must put aside those opinions and values if needed, and carry out your wishes.    Filling out the legal documents. There are several kinds of legal documents for advance care planning. Each one tells health care providers your wishes. The documents may vary by state. They must be signed and may need to be witnessed or notarized. You can cancel or change them whenever you wish. Depending on your state, the documents may include a Healthcare Proxy form, Living Will, Durable Medical Power of , Advance Directive, or others.  The Familys Role  The best help a family can give is to support their loved ones wishes. Open and honest communication is vital. Family should express any concerns they have about the patients choices while the patient can still make decisions.    [id number] [employer]. [address] [place] [address], [place] [address]. All rights reserved. This information is  not intended as a substitute for professional medical care. Always follow your healthcare professional's instructions.         Also, [facility] offers a free, downloadable health care directive that allows you to share your treatment choices and personal preferences if you cannot communicate your wishes. It also allows you to appoint another person (called a health care agent) to make health care decisions if you are unable to do so. You can download an advance directive by going here: [email or web address]     Patient Education   Personalized Prevention Plan  You are due for the preventive services outlined below.  Your care team is available to assist you in scheduling these services.  If you have already completed any of these items, please share that information with your care team to update in your medical record.  Health Maintenance   Topic Date Due   ? HEPATITIS C SCREENING  1955   ? ADVANCE CARE PLANNING  09/16/2014   ? PNEUMOCOCCAL IMMUNIZATION 65+ LOW/MEDIUM RISK (1 of 2 - PCV13) 06/04/2019   ? FALL RISK ASSESSMENT  06/04/2019   ? ZOSTER VACCINES (2 of 2) 06/10/2019   ? MEDICARE ANNUAL WELLNESS VISIT  04/15/2020   ? INFLUENZA VACCINE RULE BASED (1) 08/01/2020   ? TD 18+ HE  02/22/2021   ? MAMMOGRAM  03/29/2021   ? DXA SCAN  04/22/2021   ? LIPID  04/15/2024   ? COLORECTAL CANCER SCREENING  05/14/2029   ? HEPATITIS B VACCINES  Aged Out

## 2021-06-23 NOTE — TELEPHONE ENCOUNTER
RN cannot approve Refill Request    RN can NOT refill this medication med is not covered by policy/route to provider     . Last office visit: 11/9/2015 Milady Awan MD Last Physical: 2/27/2018 Last MTM visit: Visit date not found Last visit same specialty: 1/9/2017 Carmen Mauricio MD.  Next visit within 3 mo: Visit date not found  Next physical within 3 mo: Visit date not found      Dahlia Harmon, [facility] Triage/Med Refill 1/12/2019    Requested Prescriptions   Pending Prescriptions Disp Refills     ergocalciferol (ERGOCALCIFEROL) 50,000 unit capsule [Pharmacy Med Name: VITAMIN D2 50,000IU (ERGO) CAP RX] 12 capsule 0     Sig: TAKE 1 CAPSULE BY MOUTH ONCE A WEEK FOR 12 DOSES    There is no refill protocol information for this order

## 2021-06-24 NOTE — TELEPHONE ENCOUNTER
RN cannot approve Refill Request    RN can NOT refill this medication Protocol failed and NO refill given. Last office visit: 11/9/2015 Milady Awan MD Last Physical: 2/27/2018 Last MTM visit: Visit date not found Last visit same specialty: 1/9/2017 Carmen Mauricio MD.  Next visit within 3 mo: Visit date not found  Next physical within 3 mo: Visit date not found      Dalila Burton, [facility] Triage/Med Refill 2/28/2019    Requested Prescriptions   Pending Prescriptions Disp Refills     atorvastatin (LIPITOR) 10 MG tablet [Pharmacy Med Name: ATORVASTATIN 10MG TABLETS] 90 tablet 0     Sig: TAKE ONE TABLET BY MOUTH EVERY NIGHT AT BEDTIME    Statins Refill Protocol (Hmg CoA Reductase Inhibitors) Failed - 2/28/2019  3:39 AM       Failed - PCP or prescribing provider visit in past 12 months     Last office visit with prescriber/PCP: 11/9/2015 Milady Awan MD OR same dept: Visit date not found OR same specialty: 1/9/2017 Carmen Mauricio MD  Last physical: 2/27/2018 Last MTM visit: Visit date not found   Next visit within 3 mo: Visit date not found  Next physical within 3 mo: Visit date not found  Prescriber OR PCP: Milady Awan MD  Last diagnosis associated with med order: 1. Hyperlipemia  - atorvastatin (LIPITOR) 10 MG tablet [Pharmacy Med Name: ATORVASTATIN 10MG TABLETS]; TAKE ONE TABLET BY MOUTH EVERY NIGHT AT BEDTIME  Dispense: 90 tablet; Refill: 0    2. Lumbar radiculopathy  - gabapentin (NEURONTIN) 600 MG tablet [Pharmacy Med Name: GABAPENTIN 600MG TABLETS]; TAKE ONE-HALF TO ONE TABLET BY MOUTH EVERY NIGHT AT BEDTIME AS NEEDED  Dispense: 90 tablet; Refill: 0    If protocol passes may refill for 12 months if within 3 months of last provider visit (or a total of 15 months).             gabapentin (NEURONTIN) 600 MG tablet [Pharmacy Med Name: GABAPENTIN 600MG TABLETS] 90 tablet 0     Sig: TAKE ONE-HALF TO ONE TABLET BY MOUTH EVERY NIGHT AT BEDTIME AS NEEDED     Gabapentin/Levetiracetam/Tiagabine Refill Protocol  Failed - 2/28/2019  3:39 AM       Failed - PCP or prescribing provider visit in past 12 months or next 3 months    Last office visit with prescriber/PCP: 11/9/2015 Milady Awan MD OR same dept: Visit date not found OR same specialty: 1/9/2017 Carmen Mauricio MD  Last physical: 2/27/2018 Last MTM visit: Visit date not found   Next visit within 3 mo: Visit date not found  Next physical within 3 mo: Visit date not found  Prescriber OR PCP: Milady Awan MD  Last diagnosis associated with med order: 1. Hyperlipemia  - atorvastatin (LIPITOR) 10 MG tablet [Pharmacy Med Name: ATORVASTATIN 10MG TABLETS]; TAKE ONE TABLET BY MOUTH EVERY NIGHT AT BEDTIME  Dispense: 90 tablet; Refill: 0    2. Lumbar radiculopathy  - gabapentin (NEURONTIN) 600 MG tablet [Pharmacy Med Name: GABAPENTIN 600MG TABLETS]; TAKE ONE-HALF TO ONE TABLET BY MOUTH EVERY NIGHT AT BEDTIME AS NEEDED  Dispense: 90 tablet; Refill: 0    If protocol passes may refill for 12 months if within 3 months of last provider visit (or a total of 15 months).

## 2021-06-25 NOTE — TELEPHONE ENCOUNTER
RN cannot approve Refill Request    RN can NOT refill this medication Protocol failed and NO refill given. Last office visit: Visit date not found Last Physical: 8/20/2020 Last MTM visit: Visit date not found Last visit same specialty: Visit date not found.  Next visit within 3 mo: Visit date not found  Next physical within 3 mo: Visit date not found      Emiliana Street, [facility] Triage/Med Refill 6/4/2021    Requested Prescriptions   Pending Prescriptions Disp Refills     VITAMIN D2 1,250 mcg (50,000 unit) capsule [Pharmacy Med Name: VITAMIN D (ERGOCALCIFE 1.25 MG CAPS] 12 capsule 0     Sig: TAKE ONE CAPSULE BY MOUTH ONCE WEEKLY       There is no refill protocol information for this order

## 2021-07-05 ENCOUNTER — COMMUNICATION - HEALTHEAST (OUTPATIENT)
Dept: FAMILY MEDICINE | Facility: CLINIC | Age: 66
End: 2021-07-05

## 2021-07-06 ENCOUNTER — COMMUNICATION - HEALTHEAST (OUTPATIENT)
Dept: SCHEDULING | Facility: CLINIC | Age: 66
End: 2021-07-06

## 2021-07-13 ENCOUNTER — RECORDS - HEALTHEAST (OUTPATIENT)
Dept: ADMINISTRATIVE | Facility: CLINIC | Age: 66
End: 2021-07-13

## 2021-08-19 DIAGNOSIS — I10 HTN (HYPERTENSION): Primary | ICD-10-CM

## 2021-08-23 RX ORDER — LISINOPRIL AND HYDROCHLOROTHIAZIDE 12.5; 2 MG/1; MG/1
1 TABLET ORAL DAILY
Qty: 90 TABLET | Refills: 0 | Status: SHIPPED | OUTPATIENT
Start: 2021-08-23 | End: 2021-11-22

## 2021-08-23 NOTE — TELEPHONE ENCOUNTER
"Disp Refills Start End SHANNON    lisinopriL-hydrochlorothiazide (PRINZIDE,ZESTORETIC) 20-12.5 mg per tablet 90 tablet 1 2/20/2021  No   Sig: TAKE ONE TABLET BY MOUTH ONCE DAILY   Sent to pharmacy as: lisinopril 20 mg-hydrochlorothiazide 12.5 mg tablet (PRINZIDE,ZESTORETIC)   E-Prescribing Status: Receipt confirmed by pharmacy (2/20/2021  6:48 PM CST)       Last Written Prescription Date:  2/20/21  Last Fill Quantity: 90,  # refills: 1   Last office visit provider:  7/5/21     Requested Prescriptions   Pending Prescriptions Disp Refills     lisinopril-hydrochlorothiazide (ZESTORETIC) 20-12.5 MG tablet [Pharmacy Med Name: LISINOPRIL-HYDROCHLORO 20-12.5 TABS] 90 tablet 1     Sig: TAKE ONE TABLET BY MOUTH ONCE DAILY       Diuretics (Including Combos) Protocol Failed - 8/19/2021  5:01 AM        Failed - Medication is active on med list        Passed - Blood pressure under 140/90 in past 12 months     BP Readings from Last 3 Encounters:   08/20/20 128/60   02/07/19 132/86                 Passed - Recent (12 mo) or future (30 days) visit within the authorizing provider's specialty     Patient has had an office visit with the authorizing provider or a provider within the authorizing providers department within the previous 12 mos or has a future within next 30 days. See \"Patient Info\" tab in inbasket, or \"Choose Columns\" in Meds & Orders section of the refill encounter.              Passed - Patient is age 18 or older        Passed - No active pregancy on record        Passed - Normal serum creatinine on file in past 12 months     Recent Labs   Lab Test 08/20/20  1203   CR 0.73              Passed - Normal serum potassium on file in past 12 months     Recent Labs   Lab Test 08/20/20  1203   POTASSIUM 4.0                    Passed - Normal serum sodium on file in past 12 months     Recent Labs   Lab Test 08/20/20  1203                 Passed - No positive pregnancy test in past 12 months       ACE Inhibitors (Including " "Combos) Protocol Failed - 8/19/2021  5:01 AM        Failed - Medication is active on med list        Passed - Blood pressure under 140/90 in past 12 months     BP Readings from Last 3 Encounters:   08/20/20 128/60   02/07/19 132/86                 Passed - Recent (12 mo) or future (30 days) visit within the authorizing provider's specialty     Patient has had an office visit with the authorizing provider or a provider within the authorizing providers department within the previous 12 mos or has a future within next 30 days. See \"Patient Info\" tab in inbasket, or \"Choose Columns\" in Meds & Orders section of the refill encounter.              Passed - Patient is age 18 or older        Passed - No active pregnancy on record        Passed - Normal serum creatinine on file in past 12 months     Recent Labs   Lab Test 08/20/20  1203   CR 0.73       Ok to refill medication if creatinine is low          Passed - Normal serum potassium on file in past 12 months     Recent Labs   Lab Test 08/20/20  1203   POTASSIUM 4.0             Passed - No positive pregnancy test within past 12 months             Ricky Edwards RN 08/23/21 7:10 AM  "

## 2021-08-27 DIAGNOSIS — E55.9 VITAMIN D DEFICIENCY: Primary | ICD-10-CM

## 2021-08-29 RX ORDER — ERGOCALCIFEROL 1.25 MG/1
CAPSULE, LIQUID FILLED ORAL
Qty: 12 CAPSULE | Refills: 0 | Status: SHIPPED | OUTPATIENT
Start: 2021-08-29 | End: 2021-11-26

## 2021-09-07 DIAGNOSIS — M54.16 LUMBAR RADICULOPATHY: Primary | ICD-10-CM

## 2021-09-07 RX ORDER — GABAPENTIN 600 MG/1
TABLET ORAL
Qty: 300 TABLET | Refills: 0 | Status: SHIPPED | OUTPATIENT
Start: 2021-09-07 | End: 2022-03-17

## 2021-09-07 NOTE — TELEPHONE ENCOUNTER
Disp Refills Start End SHANNON    gabapentin (NEURONTIN) 600 MG tablet 90 tablet 3 5/25/2020  No   Sig: TAKE 1/2 TO 1 TABLET BY MOUTH EVERY NIGHT AT BEDTIME AS NEEDED   Sent to pharmacy as: gabapentin 600 mg tablet (NEURONTIN)   E-Prescribing Status: Receipt confirmed by pharmacy (5/25/2020 11:55 AM CDT)   gabapentin (NEURONTIN) 600 MG tablet [[id number]]    Electronically signed by: Milady Awan MD on 05/25/20 1155 Status: Active   Ordering user: Milady Awan MD 05/25/20 1155 Authorized by: Milady Awan MD   Frequency:  05/25/20 - Until Discontinued Released by: Milady Aawn MD 05/25/20 1155   Diagnoses  Lumbar radiculopathy [M54.16]       Routing refill request to provider for review/approval because:  Drug not on the G refill protocol     Last Written Prescription Date:  5/25/20  Last Fill Quantity: 90,  # refills: 3   Last office visit provider:  7/5/21     Requested Prescriptions   Pending Prescriptions Disp Refills     gabapentin (NEURONTIN) 600 MG tablet [Pharmacy Med Name: GABAPENTIN 600MG TABS] 300 tablet 0     Sig: TAKE ONE HALF TO ONE TABLET (300-600 MG) BY MOUTH EVERY NIGHT AT BEDTIME AS NEEDED       There is no refill protocol information for this order          Ricky Edwards RN 09/07/21 10:36 AM

## 2021-09-11 ENCOUNTER — HEALTH MAINTENANCE LETTER (OUTPATIENT)
Age: 66
End: 2021-09-11

## 2021-09-23 DIAGNOSIS — E78.00 HYPERCHOLESTEREMIA: Primary | ICD-10-CM

## 2021-09-23 NOTE — TELEPHONE ENCOUNTER
"Routing refill request to provider for review/approval because:  Drug not active on patient's medication list  Labs not current:  LDL  Patient needs to be seen because it has been more than 1 year since last office visit.       Last office visit provider:  7/5/21     Requested Prescriptions   Pending Prescriptions Disp Refills     atorvastatin (LIPITOR) 10 MG tablet [Pharmacy Med Name: ATORVASTATIN CALCIUM 10MG TABS] 90 tablet 1     Sig: TAKE ONE TABLET BY MOUTH EVERY NIGHT AT BEDTIME       Statins Protocol Failed - 9/23/2021  5:01 AM        Failed - LDL on file in past 12 months     Recent Labs   Lab Test 08/20/20  1203   LDL 95             Failed - Recent (12 mo) or future (30 days) visit within the authorizing provider's specialty     Patient has had an office visit with the authorizing provider or a provider within the authorizing providers department within the previous 12 mos or has a future within next 30 days. See \"Patient Info\" tab in inbasket, or \"Choose Columns\" in Meds & Orders section of the refill encounter.              Failed - Medication is active on med list        Passed - No abnormal creatine kinase in past 12 months     No lab results found.             Passed - Patient is age 18 or older        Passed - No active pregnancy on record        Passed - No positive pregnancy test in past 12 months             americo jackson RN 09/23/21 3:13 PM  "

## 2021-09-24 RX ORDER — ATORVASTATIN CALCIUM 10 MG/1
TABLET, FILM COATED ORAL
Qty: 90 TABLET | Refills: 1 | Status: SHIPPED | OUTPATIENT
Start: 2021-09-24 | End: 2022-03-17

## 2021-11-06 ENCOUNTER — HEALTH MAINTENANCE LETTER (OUTPATIENT)
Age: 66
End: 2021-11-06

## 2021-11-19 DIAGNOSIS — I10 HTN (HYPERTENSION): ICD-10-CM

## 2021-11-22 RX ORDER — LISINOPRIL AND HYDROCHLOROTHIAZIDE 12.5; 2 MG/1; MG/1
TABLET ORAL
Qty: 90 TABLET | Refills: 0 | Status: SHIPPED | OUTPATIENT
Start: 2021-11-22 | End: 2022-02-15

## 2021-11-22 NOTE — TELEPHONE ENCOUNTER
"Routing refill request to provider for review/approval because:  Labs not current:  Multiple  BP not current    Last Written Prescription Date:  8/23/21  Last Fill Quantity: 90,  # refills: 0   Last office visit provider:  7/5/21     Requested Prescriptions   Pending Prescriptions Disp Refills     lisinopril-hydrochlorothiazide (ZESTORETIC) 20-12.5 MG tablet [Pharmacy Med Name: LISINOPRIL-HYDROCHLORO 20-12.5 TABS] 90 tablet 0     Sig: TAKE ONE TABLET BY MOUTH ONCE DAILY       Diuretics (Including Combos) Protocol Failed - 11/19/2021  5:01 AM        Failed - Blood pressure under 140/90 in past 12 months     BP Readings from Last 3 Encounters:   08/20/20 128/60   02/07/19 132/86                 Failed - Recent (12 mo) or future (30 days) visit within the authorizing provider's specialty     Patient has had an office visit with the authorizing provider or a provider within the authorizing providers department within the previous 12 mos or has a future within next 30 days. See \"Patient Info\" tab in inbasket, or \"Choose Columns\" in Meds & Orders section of the refill encounter.              Failed - Normal serum creatinine on file in past 12 months     Recent Labs   Lab Test 08/20/20  1203   CR 0.73              Failed - Normal serum potassium on file in past 12 months     Recent Labs   Lab Test 08/20/20  1203   POTASSIUM 4.0                    Failed - Normal serum sodium on file in past 12 months     Recent Labs   Lab Test 08/20/20  1203                 Passed - Medication is active on med list        Passed - Patient is age 18 or older        Passed - No active pregancy on record        Passed - No positive pregnancy test in past 12 months       ACE Inhibitors (Including Combos) Protocol Failed - 11/19/2021  5:01 AM        Failed - Blood pressure under 140/90 in past 12 months     BP Readings from Last 3 Encounters:   08/20/20 128/60   02/07/19 132/86                 Failed - Recent (12 mo) or future (30 days) " "visit within the authorizing provider's specialty     Patient has had an office visit with the authorizing provider or a provider within the authorizing providers department within the previous 12 mos or has a future within next 30 days. See \"Patient Info\" tab in inbasket, or \"Choose Columns\" in Meds & Orders section of the refill encounter.              Failed - Normal serum creatinine on file in past 12 months     Recent Labs   Lab Test 08/20/20  1203   CR 0.73       Ok to refill medication if creatinine is low          Failed - Normal serum potassium on file in past 12 months     Recent Labs   Lab Test 08/20/20  1203   POTASSIUM 4.0             Passed - Medication is active on med list        Passed - Patient is age 18 or older        Passed - No active pregnancy on record        Passed - No positive pregnancy test within past 12 months             Ricky Edwards RN 11/22/21 11:39 AM  "

## 2021-11-24 DIAGNOSIS — E55.9 VITAMIN D DEFICIENCY: ICD-10-CM

## 2021-11-26 RX ORDER — ERGOCALCIFEROL 1.25 MG/1
CAPSULE, LIQUID FILLED ORAL
Qty: 12 CAPSULE | Refills: 0 | Status: SHIPPED | OUTPATIENT
Start: 2021-11-26 | End: 2022-02-15

## 2022-01-31 ENCOUNTER — LAB REQUISITION (OUTPATIENT)
Dept: LAB | Facility: HOSPITAL | Age: 67
End: 2022-01-31

## 2022-01-31 PROCEDURE — U0005 INFEC AGEN DETEC AMPLI PROBE: HCPCS | Performed by: INTERNAL MEDICINE

## 2022-02-01 LAB — SARS-COV-2 RNA RESP QL NAA+PROBE: NEGATIVE

## 2022-02-15 DIAGNOSIS — I10 HTN (HYPERTENSION): ICD-10-CM

## 2022-02-15 DIAGNOSIS — E55.9 VITAMIN D DEFICIENCY: ICD-10-CM

## 2022-02-15 RX ORDER — ERGOCALCIFEROL 1.25 MG/1
CAPSULE, LIQUID FILLED ORAL
Qty: 12 CAPSULE | Refills: 0 | Status: SHIPPED | OUTPATIENT
Start: 2022-02-15 | End: 2022-05-12

## 2022-02-15 RX ORDER — LISINOPRIL AND HYDROCHLOROTHIAZIDE 12.5; 2 MG/1; MG/1
TABLET ORAL
Qty: 90 TABLET | Refills: 0 | Status: SHIPPED | OUTPATIENT
Start: 2022-02-15 | End: 2022-03-17

## 2022-03-15 ASSESSMENT — ACTIVITIES OF DAILY LIVING (ADL): CURRENT_FUNCTION: NO ASSISTANCE NEEDED

## 2022-03-17 ENCOUNTER — OFFICE VISIT (OUTPATIENT)
Dept: FAMILY MEDICINE | Facility: CLINIC | Age: 67
End: 2022-03-17
Payer: COMMERCIAL

## 2022-03-17 VITALS
DIASTOLIC BLOOD PRESSURE: 61 MMHG | WEIGHT: 168 LBS | BODY MASS INDEX: 32.98 KG/M2 | RESPIRATION RATE: 16 BRPM | HEART RATE: 56 BPM | HEIGHT: 60 IN | SYSTOLIC BLOOD PRESSURE: 141 MMHG

## 2022-03-17 DIAGNOSIS — Z11.59 NEED FOR HEPATITIS C SCREENING TEST: ICD-10-CM

## 2022-03-17 DIAGNOSIS — M89.9 DISORDER OF BONE AND CARTILAGE: ICD-10-CM

## 2022-03-17 DIAGNOSIS — Z00.00 ENCOUNTER FOR MEDICARE ANNUAL WELLNESS EXAM: Primary | ICD-10-CM

## 2022-03-17 DIAGNOSIS — M94.9 DISORDER OF BONE AND CARTILAGE: ICD-10-CM

## 2022-03-17 DIAGNOSIS — M54.16 LUMBAR RADICULOPATHY: Chronic | ICD-10-CM

## 2022-03-17 DIAGNOSIS — E78.00 HYPERCHOLESTEREMIA: ICD-10-CM

## 2022-03-17 DIAGNOSIS — I10 PRIMARY HYPERTENSION: ICD-10-CM

## 2022-03-17 DIAGNOSIS — R06.09 DYSPNEA ON EXERTION: ICD-10-CM

## 2022-03-17 LAB
ALBUMIN SERPL-MCNC: 3.7 G/DL (ref 3.5–5)
ALP SERPL-CCNC: 82 U/L (ref 45–120)
ALT SERPL W P-5'-P-CCNC: 15 U/L (ref 0–45)
ANION GAP SERPL CALCULATED.3IONS-SCNC: 9 MMOL/L (ref 5–18)
AST SERPL W P-5'-P-CCNC: 19 U/L (ref 0–40)
BILIRUB SERPL-MCNC: 0.6 MG/DL (ref 0–1)
BUN SERPL-MCNC: 16 MG/DL (ref 8–22)
CALCIUM SERPL-MCNC: 9.2 MG/DL (ref 8.5–10.5)
CHLORIDE BLD-SCNC: 106 MMOL/L (ref 98–107)
CHOLEST SERPL-MCNC: 188 MG/DL
CO2 SERPL-SCNC: 27 MMOL/L (ref 22–31)
CREAT SERPL-MCNC: 0.73 MG/DL (ref 0.6–1.1)
ERYTHROCYTE [DISTWIDTH] IN BLOOD BY AUTOMATED COUNT: 12.8 % (ref 10–15)
FASTING STATUS PATIENT QL REPORTED: YES
GFR SERPL CREATININE-BSD FRML MDRD: 90 ML/MIN/1.73M2
GLUCOSE BLD-MCNC: 91 MG/DL (ref 70–125)
HCT VFR BLD AUTO: 40.2 % (ref 35–47)
HDLC SERPL-MCNC: 37 MG/DL
HGB BLD-MCNC: 13.3 G/DL (ref 11.7–15.7)
LDLC SERPL CALC-MCNC: 111 MG/DL
MCH RBC QN AUTO: 29.2 PG (ref 26.5–33)
MCHC RBC AUTO-ENTMCNC: 33.1 G/DL (ref 31.5–36.5)
MCV RBC AUTO: 88 FL (ref 78–100)
PLATELET # BLD AUTO: 259 10E3/UL (ref 150–450)
POTASSIUM BLD-SCNC: 4 MMOL/L (ref 3.5–5)
PROT SERPL-MCNC: 6.4 G/DL (ref 6–8)
RBC # BLD AUTO: 4.56 10E6/UL (ref 3.8–5.2)
SODIUM SERPL-SCNC: 142 MMOL/L (ref 136–145)
TRIGL SERPL-MCNC: 200 MG/DL
TSH SERPL DL<=0.005 MIU/L-ACNC: 1.99 UIU/ML (ref 0.3–5)
WBC # BLD AUTO: 4.8 10E3/UL (ref 4–11)

## 2022-03-17 PROCEDURE — 36415 COLL VENOUS BLD VENIPUNCTURE: CPT | Performed by: FAMILY MEDICINE

## 2022-03-17 PROCEDURE — 84443 ASSAY THYROID STIM HORMONE: CPT | Performed by: FAMILY MEDICINE

## 2022-03-17 PROCEDURE — 90732 PPSV23 VACC 2 YRS+ SUBQ/IM: CPT | Performed by: FAMILY MEDICINE

## 2022-03-17 PROCEDURE — 99397 PER PM REEVAL EST PAT 65+ YR: CPT | Mod: 25 | Performed by: FAMILY MEDICINE

## 2022-03-17 PROCEDURE — 90471 IMMUNIZATION ADMIN: CPT | Performed by: FAMILY MEDICINE

## 2022-03-17 PROCEDURE — 80061 LIPID PANEL: CPT | Performed by: FAMILY MEDICINE

## 2022-03-17 PROCEDURE — 90715 TDAP VACCINE 7 YRS/> IM: CPT | Performed by: FAMILY MEDICINE

## 2022-03-17 PROCEDURE — 80053 COMPREHEN METABOLIC PANEL: CPT | Performed by: FAMILY MEDICINE

## 2022-03-17 PROCEDURE — 85027 COMPLETE CBC AUTOMATED: CPT | Performed by: FAMILY MEDICINE

## 2022-03-17 PROCEDURE — 90472 IMMUNIZATION ADMIN EACH ADD: CPT | Performed by: FAMILY MEDICINE

## 2022-03-17 PROCEDURE — 99214 OFFICE O/P EST MOD 30 MIN: CPT | Mod: 25 | Performed by: FAMILY MEDICINE

## 2022-03-17 PROCEDURE — 86803 HEPATITIS C AB TEST: CPT | Performed by: FAMILY MEDICINE

## 2022-03-17 RX ORDER — ATORVASTATIN CALCIUM 10 MG/1
TABLET, FILM COATED ORAL
Qty: 90 TABLET | Refills: 3 | Status: SHIPPED | OUTPATIENT
Start: 2022-03-17 | End: 2023-04-13

## 2022-03-17 RX ORDER — GABAPENTIN 600 MG/1
TABLET ORAL
Qty: 90 TABLET | Refills: 3 | Status: SHIPPED | OUTPATIENT
Start: 2022-03-17 | End: 2023-04-19

## 2022-03-17 RX ORDER — LISINOPRIL AND HYDROCHLOROTHIAZIDE 12.5; 2 MG/1; MG/1
1 TABLET ORAL DAILY
Qty: 90 TABLET | Refills: 3 | Status: SHIPPED | OUTPATIENT
Start: 2022-03-17 | End: 2023-05-12

## 2022-03-17 ASSESSMENT — ACTIVITIES OF DAILY LIVING (ADL): CURRENT_FUNCTION: NO ASSISTANCE NEEDED

## 2022-03-17 NOTE — PROGRESS NOTES
"SUBJECTIVE:   Dahlia Torres is a 66 year old female who presents for Preventive Visit.      Patient has been advised of split billing requirements and indicates understanding: Yes  Are you in the first 12 months of your Medicare coverage?  No    Healthy Habits:     In general, how would you rate your overall health?  Good    Frequency of exercise:  1 day/week    Duration of exercise:  Less than 15 minutes    Do you usually eat at least 4 servings of fruit and vegetables a day, include whole grains    & fiber and avoid regularly eating high fat or \"junk\" foods?  No    Taking medications regularly:  Yes    Medication side effects:  None    Ability to successfully perform activities of daily living:  No assistance needed    Home Safety:  No safety concerns identified    Hearing Impairment:  No hearing concerns    In the past 6 months, have you been bothered by leaking of urine?  No    In general, how would you rate your overall mental or emotional health?  Excellent      PHQ-2 Total Score: 0    Additional concerns today:  No        Dahlia presents for her annual wellness exam.  As far as healthcare maintenance, she has her mammogram scheduled.  She is due for a bone density as she has had some osteopenia.  She had a normal colonoscopy in 2019 so is not due until 2029.  Her hypertension and cholesterol have been stable on her current medications.  She takes gabapentin for some radiculopathy, typically 1 tab at bedtime.  She is otherwise feeling well.  She be retiring this spring.  She has had some mild dyspnea on exertion.  This is mainly with stairs.  She admits she has gained some weight and she is not been exercising regularly.  She is not having any other time.  She not having any associated chest pain, nausea, or diaphoresis.  Has no prior history of heart disease.  Do you feel safe in your environment? Yes    Have you ever done Advance Care Planning? (For example, a Health Directive, POLST, or a discussion with a " medical provider or your loved ones about your wishes): No, advance care planning information given to patient to review.  Advanced care planning was discussed at today's visit.       Fall risk  Fallen 2 or more times in the past year?: No  Any fall with injury in the past year?: No    Cognitive Screening   1) Repeat 3 items (Leader, Season, Table)    2) Clock draw: NORMAL  3) 3 item recall: Recalls 3 objects  Results: NORMAL clock, 1-2 items recalled: COGNITIVE IMPAIRMENT LESS LIKELY    Mini-CogTM Copyright S Adalber[employer]. Licensed by the author for use in [facility]; reprinted with permission ([email or web address]). All rights reserved.      Do you have sleep apnea, excessive snoring or daytime drowsiness?: no    Reviewed and updated as needed this visit by clinical staff   Tobacco  Allergies  Meds              Reviewed and updated as needed this visit by Provider                 Social History     Tobacco Use     Smoking status: Never Smoker     Smokeless tobacco: Never Used   Substance Use Topics     Alcohol use: No         Alcohol Use 3/15/2022   Prescreen: >3 drinks/day or >7 drinks/week? No               Current providers sharing in care for this patient include:   Patient Care Team:  Milady Awan as PCP - General (Family Practice)  Deejay Lala MD as Assigned PCP    The following health maintenance items are reviewed in Epic and correct as of today:  Health Maintenance Due   Topic Date Due     ANNUAL REVIEW OF HM ORDERS  Never done     ADVANCE CARE PLANNING  Never done     HEPATITIS C SCREENING  Never done     DTAP/TDAP/TD IMMUNIZATION (2 - Td or Tdap) 02/22/2021     MAMMO SCREENING  03/29/2021     MEDICARE ANNUAL WELLNESS VISIT  08/20/2021     Pneumococcal Vaccine: 65+ Years (2 of 2 - PPSV23) 08/20/2021     Lab work is in process  Pneumonia Vaccine:Adults age 65+ who have not received previous Pneumovax (PPSV23) or PCV13 as an adult: Should first be given PCV13 AND then should be given PPSV23 6-12  "months after PCV13    Breast CA Risk Assessment (FHS-7) 3/15/2022   Do you have a family history of breast, colon, or ovarian cancer? No / Unknown       Mammogram Screening: Recommended mammography every 1-2 years with patient discussion and risk factor consideration  Pertinent mammograms are reviewed under the imaging tab.    Review of Systems  Constitutional, HEENT, cardiovascular, pulmonary, GI, , musculoskeletal, neuro, skin, endocrine and psych systems are negative, except as otherwise noted.    OBJECTIVE:   Resp 16   Ht 1.511 m (4' 11.5\")   LMP  (LMP Unknown)   BMI 32.57 kg/m   Estimated body mass index is 32.57 kg/m  as calculated from the following:    Height as of this encounter: 1.511 m (4' 11.5\").    Weight as of 8/20/20: 74.4 kg (164 lb).  Physical Exam  GENERAL APPEARANCE: healthy, alert and no distress  EYES: Eyes grossly normal to inspection, PERRL and conjunctivae and sclerae normal  HENT: ear canals and TM's normal, nose and mouth without ulcers or lesions, oropharynx clear and oral mucous membranes moist  NECK: no adenopathy, no asymmetry, masses, or scars and thyroid normal to palpation  RESP: lungs clear to auscultation - no rales, rhonchi or wheezes  BREAST: normal without masses, tenderness or nipple discharge and no palpable axillary masses or adenopathy  CV: regular rate and rhythm, normal S1 S2, no S3 or S4, no murmur, click or rub, no peripheral edema and peripheral pulses strong  ABDOMEN: soft, nontender, no hepatosplenomegaly, no masses and bowel sounds normal  MS: no musculoskeletal defects are noted and gait is age appropriate without ataxia  SKIN: no suspicious lesions or rashes  NEURO: Normal strength and tone, sensory exam grossly normal, mentation intact and speech normal  PSYCH: mentation appears normal and affect normal/bright    Diagnostic Test Results:  Labs reviewed in Epic  No results found for this or any previous visit (from the past 24 hour(s)).    ASSESSMENT / PLAN: "   1. Encounter for Medicare annual wellness exam  Dahlia presents for annual wellness exam.  As far as healthcare maintenance, she has her mammogram set up for next week.  She had a normal colonoscopy in 2019 so is not due until 2029.  Her last bone density was 2019 and showed some osteopenia so she is due for recheck today.  She is due for some immunizations today.  We will update her tetanus and her Pneumovax.  - CBC with platelets; Future  - Comprehensive metabolic panel; Future  - Lipid panel reflex to direct LDL Fasting; Future  - TSH with free T4 reflex; Future    2. Need for hepatitis C screening test  - Hepatitis C Screen Reflex to HCV RNA Quant and Genotype; Future    3. Hypercholesteremia  Her cholesterol has been stable on her current dose of Lipitor.  She is due for blood work today.  - Comprehensive metabolic panel; Future  - Lipid panel reflex to direct LDL Fasting; Future  - atorvastatin (LIPITOR) 10 MG tablet; TAKE ONE TABLET BY MOUTH EVERY NIGHT AT BEDTIME  Dispense: 90 tablet; Refill: 3    4. Osteopenia  She has a history of some osteopenia.  She is good well calcium and vitamin D.  She is currently not doing much exercise but plans on changing this in the near future.  - DX Hip/Pelvis/Spine; Future    5. Primary hypertension  Under good control with her current medication.  - lisinopril-hydrochlorothiazide (ZESTORETIC) 20-12.5 MG tablet; Take 1 tablet by mouth daily  Dispense: 90 tablet; Refill: 3    6. Lumbar radiculopathy  Takes 1 tablet at bedtime.  - gabapentin (NEURONTIN) 600 MG tablet; TAKE ONE HALF TO ONE TABLET (300-600 MG) BY MOUTH EVERY NIGHT AT BEDTIME AS NEEDED  Dispense: 90 tablet; Refill: 3    7. Dyspnea on exertion  She has had just some mild dyspnea on exertion with stairs.  This may be a combination of deconditioning and weight gain.  She will plan on trying to start an exercise program.  Certainly if her symptoms are worsening or more severe, would have her come back and see me  "and we would do further work-up with possibly echo or stress test.    Patient has been advised of split billing requirements and indicates understanding: Yes    COUNSELING:  Reviewed preventive health counseling, as reflected in patient instructions       Regular exercise       Healthy diet/nutrition    Estimated body mass index is 32.57 kg/m  as calculated from the following:    Height as of this encounter: 1.511 m (4' 11.5\").    Weight as of 8/20/20: 74.4 kg (164 lb).    Weight management plan: Discussed healthy diet and exercise guidelines    She reports that she has never smoked. She has never used smokeless tobacco.      Appropriate preventive services were discussed with this patient, including applicable screening as appropriate for cardiovascular disease, diabetes, osteopenia/osteoporosis, and glaucoma.  As appropriate for age/gender, discussed screening for colorectal cancer, prostate cancer, breast cancer, and cervical cancer. Checklist reviewing preventive services available has been given to the patient.    Reviewed patients plan of care and provided an AVS. The Basic Care Plan (routine screening as documented in Health Maintenance) for Dahlia meets the Care Plan requirement. This Care Plan has been established and reviewed with the Patient.    Counseling Resources:  ATP IV Guidelines  Pooled Cohorts Equation Calculator  Breast Cancer Risk Calculator  Breast Cancer: Medication to Reduce Risk  FRAX Risk Assessment  ICSI Preventive Guidelines  Dietary Guidelines for Americans, 2010  [employer] MyPlate  ASA Prophylaxis  Lung CA Screening    Milady Awan  [facility]    Identified Health Risks:  "

## 2022-03-17 NOTE — PATIENT INSTRUCTIONS
Bone density scan - scheduling - [phone]        Patient Education   Personalized Prevention Plan  You are due for the preventive services outlined below.  Your care team is available to assist you in scheduling these services.  If you have already completed any of these items, please share that information with your care team to update in your medical record.  Health Maintenance Due   Topic Date Due     ANNUAL REVIEW OF HM ORDERS  Never done     Hepatitis C Screening  Never done     Diptheria Tetanus Pertussis (DTAP/TDAP/TD) Vaccine (2 - Td or Tdap) 02/22/2021     Mammogram  03/29/2021     Pneumococcal Vaccine (2 of 2 - PPSV23) 08/20/2021       Exercise for a Healthier Heart  You may wonder how you can improve the health of your heart. If you re thinking about exercise, you re on the right track. You don t need to become an athlete. But you do need a certain amount of brisk exercise to help strengthen your heart. If you have been diagnosed with a heart condition, your healthcare provider may advise exercise to help stabilize your condition. To help make exercise a habit, choose safe, fun activities.      Exercise with a friend. When activity is fun, you're more likely to stick with it.   Before you start  Check with your healthcare provider before starting an exercise program. This is especially important if you have not been active for a while. It's also important if you have a long-term (chronic) health problem such as heart disease, diabetes, or obesity. Or if you are at high risk for having these problems.   Why exercise?  Exercising regularly offers many healthy rewards. It can help you do all of the following:     Improve your blood cholesterol level to help prevent further heart trouble    Lower your blood pressure to help prevent a stroke or heart attack    Control diabetes, or reduce your risk of getting this disease    Improve your heart and lung function    Reach and stay at a healthy weight    Make  Stable. Trending daily.     your muscles stronger so you can stay active    Prevent falls and fractures by slowing the loss of bone mass (osteoporosis)    Manage stress better    Reduce your blood pressure    Improve your sense of self and your body image  Exercise tips      Ease into your routine. Set small goals. Then build on them. If you are not sure what your activity level should be, talk with your healthcare provider first before starting an exercise routine.    Exercise on most days. Aim for a total of 150 minutes (2 hours and 30 minutes) or more of moderate-intensity aerobic activity each week. Or 75 minutes (1 hour and 15 minutes) or more of vigorous-intensity aerobic activity each week. Or try for a combination of both. Moderate activity means that you breathe heavier and your heart rate increases but you can still talk. Think about doing 40 minutes of moderate exercise, 3 to 4 times a week. For best results, activity should last for about 40 minutes to lower blood pressure and cholesterol. It's OK to work up to the 40-minute period over time. Examples of moderate-intensity activity are walking 1 mile in 15 minutes. Or doing 30 to 45 minutes of yard work.    Step up your daily activity level.  Along with your exercise program, try being more active the whole day. Walk instead of drive. Or park further away so that you take more steps each day. Do more household tasks or yard work. You may not be able to meet the advised mount of physical activity. But doing some moderate- or vigorous-intensity aerobic activity can help reduce your risk for heart disease. Your healthcare provider can help you figure out what is best for you.    Choose 1 or more activities you enjoy.  Walking is one of the easiest things you can do. You can also try swimming, riding a bike, dancing, or taking an exercise class.    When to call your healthcare provider  Call your healthcare provider if you have any of these:     Chest pain or feel dizzy or  lightheaded    Burning, tightness, pressure, or heaviness in your chest, neck, shoulders, back, or arms    Abnormal shortness of breath    More joint or muscle pain    A very fast or irregular heartbeat (palpitations)  [employer] last reviewed this educational content on 7/1/2019 2000-2021 [employer]. All rights reserved. This information is not intended as a substitute for professional medical care. Always follow your healthcare professional's instructions.          Understanding USDA MyPlate  The USDA has guidelines to help you make healthy food choices. These are called MyPlate. MyPlate shows the food groups that make up healthy meals using the image of a place setting. Before you eat, think about the healthiest choices for what to put on your plate or in your cup or bowl. To learn more about building a healthy plate, visit www.choosemyplate.gov.    The food groups    Fruits. Any fruit or 100% fruit juice counts as part of the Fruit Group. Fruits may be fresh, canned, frozen, or dried, and may be whole, cut-up, or pureed. Make 1/2 of your plate fruits and vegetables.    Vegetables. Any vegetable or 100% vegetable juice counts as a member of the Vegetable Group. Vegetables may be fresh, frozen, canned, or dried. They can be served raw or cooked and may be whole, cut-up, or mashed. Make 1/2 of your plate fruits and vegetables.    Grains. All foods made from grains are part of the Grains Group. These include wheat, rice, oats, cornmeal, and barley. Grains are often used to make foods such as bread, pasta, oatmeal, cereal, tortillas, and grits. Grains should be no more than 1/4 of your plate. At least half of your grains should be whole grains.    Protein. This group includes meat, poultry, seafood, beans and peas, eggs, processed soy products (such as tofu), nuts (including nut butters), and seeds. Make protein choices no more than 1/4 of your plate. Meat and poultry choices should be lean or low  fat.    Dairy. The Dairy Group includes all fluid milk products and foods made from milk that contain calcium, such as yogurt and cheese. (Foods that have little calcium, such as cream, butter, and cream cheese, are not part of this group.) Most dairy choices should be low-fat or fat-free.    Oils. Oils aren't a food group, but they do contain essential nutrients. However it's important to watch your intake of oils. These are fats that are liquid at room temperature. They include canola, corn, olive, soybean, vegetable, and sunflower oil. Foods that are mainly oil include mayonnaise, certain salad dressings, and soft margarines. You likely already get your daily oil allowance from the foods you eat.  Things to limit  Eating healthy also means limiting these things in your diet:       Salt (sodium). Many processed foods have a lot of sodium. To keep sodium intake down, eat fresh vegetables, meats, poultry, and seafood when possible. Purchase low-sodium, reduced-sodium, or no-salt-added food products at the store. And don't add salt to your meals at home. Instead, season them with herbs and spices such as dill, oregano, cumin, and paprika. Or try adding flavor with lemon or lime zest and juice.    Saturated fat. Saturated fats are most often found in animal products such as beef, pork, and chicken. They are often solid at room temperature, such as butter. To reduce your saturated fat intake, choose leaner cuts of meat and poultry. And try healthier cooking methods such as grilling, broiling, roasting, or baking. For a simple lower-fat swap, use plain nonfat yogurt instead of mayonnaise when making potato salad or macaroni salad.    Added sugars. These are sugars added to foods. They are in foods such as ice cream, candy, soda, fruit drinks, sports drinks, energy drinks, cookies, pastries, jams, and syrups. Cut down on added sugars by sharing sweet treats with a family member or friend. You can also choose fruit for  dessert, and drink water or other unsweetened beverages.     [employer] last reviewed this educational content on 6/1/2020 2000-2021 [employer]. All rights reserved. This information is not intended as a substitute for professional medical care. Always follow your healthcare professional's instructions.

## 2022-03-18 LAB — HCV AB SERPL QL IA: NONREACTIVE

## 2022-03-21 ENCOUNTER — ANCILLARY PROCEDURE (OUTPATIENT)
Dept: MAMMOGRAPHY | Facility: CLINIC | Age: 67
End: 2022-03-21
Attending: FAMILY MEDICINE
Payer: COMMERCIAL

## 2022-03-21 DIAGNOSIS — Z12.31 VISIT FOR SCREENING MAMMOGRAM: ICD-10-CM

## 2022-03-21 PROCEDURE — 77067 SCR MAMMO BI INCL CAD: CPT

## 2022-05-11 DIAGNOSIS — E55.9 VITAMIN D DEFICIENCY: ICD-10-CM

## 2022-05-12 RX ORDER — ERGOCALCIFEROL 1.25 MG/1
CAPSULE, LIQUID FILLED ORAL
Qty: 12 CAPSULE | Refills: 0 | Status: SHIPPED | OUTPATIENT
Start: 2022-05-12 | End: 2022-08-05

## 2022-08-02 DIAGNOSIS — M94.9 DISORDER OF BONE AND CARTILAGE: Primary | ICD-10-CM

## 2022-08-02 DIAGNOSIS — M89.9 DISORDER OF BONE AND CARTILAGE: Primary | ICD-10-CM

## 2022-08-02 DIAGNOSIS — E55.9 VITAMIN D DEFICIENCY: ICD-10-CM

## 2022-08-02 RX ORDER — ERGOCALCIFEROL 1.25 MG/1
CAPSULE, LIQUID FILLED ORAL
Qty: 12 CAPSULE | Refills: 0 | OUTPATIENT
Start: 2022-08-02

## 2022-08-02 NOTE — TELEPHONE ENCOUNTER
Routing refill request to provider for review/approval because:  Drug not on the G refill protocol       Last Written Prescription Date: 5/12/22  Last Fill Quantity: 12, # refills: 0  Last office visit provider: Medley 3/17/22        Requested Prescriptions   Pending Prescriptions Disp Refills    vitamin D2 (ERGOCALCIFEROL) 70972 units (1250 mcg) capsule [Pharmacy Med Name: VITAMIN D (ERGOCALCIFE 1.25 MG CAPS] 12 capsule 0     Sig: TAKE ONE CAPSULE BY MOUTH ONCE WEEKLY        There is no refill protocol information for this order               Brook Muir RN 08/02/22 3:19 PM

## 2022-08-02 NOTE — TELEPHONE ENCOUNTER
Informed patient of message below. Patient states understanding. Patient is scheduled for a lab appt on 08/03.

## 2022-08-03 ENCOUNTER — LAB (OUTPATIENT)
Dept: LAB | Facility: CLINIC | Age: 67
End: 2022-08-03
Payer: COMMERCIAL

## 2022-08-03 DIAGNOSIS — M89.9 DISORDER OF BONE AND CARTILAGE: ICD-10-CM

## 2022-08-03 DIAGNOSIS — M94.9 DISORDER OF BONE AND CARTILAGE: ICD-10-CM

## 2022-08-03 PROCEDURE — 82306 VITAMIN D 25 HYDROXY: CPT

## 2022-08-03 PROCEDURE — 36415 COLL VENOUS BLD VENIPUNCTURE: CPT

## 2022-08-04 LAB — DEPRECATED CALCIDIOL+CALCIFEROL SERPL-MC: 54 UG/L (ref 20–75)

## 2022-08-05 ENCOUNTER — MYC REFILL (OUTPATIENT)
Dept: FAMILY MEDICINE | Facility: CLINIC | Age: 67
End: 2022-08-05

## 2022-08-05 DIAGNOSIS — E55.9 VITAMIN D DEFICIENCY: ICD-10-CM

## 2022-08-05 NOTE — TELEPHONE ENCOUNTER
Routing refill request to provider for review/approval because:  Drug not on the [facility] refill protocol     Last Written Prescription Date:  5/12/22  Last Fill Quantity: 12,  # refills: 0   Last office visit provider:  3/17/22     Requested Prescriptions   Pending Prescriptions Disp Refills     vitamin D2 (ERGOCALCIFEROL) 47879 units (1250 mcg) capsule 12 capsule 0       There is no refill protocol information for this order          Dahlia Harmon RN 08/05/22 6:09 PM

## 2022-08-06 RX ORDER — ERGOCALCIFEROL 1.25 MG/1
50000 CAPSULE, LIQUID FILLED ORAL WEEKLY
Qty: 12 CAPSULE | Refills: 0 | Status: SHIPPED | OUTPATIENT
Start: 2022-08-06 | End: 2022-10-03

## 2022-10-02 DIAGNOSIS — E55.9 VITAMIN D DEFICIENCY: ICD-10-CM

## 2022-10-02 NOTE — TELEPHONE ENCOUNTER
Routing refill request to provider for review/approval because:  Drug not on the [facility] refill protocol     Last Written Prescription Date:  8/6/22  Last Fill Quantity: 12,  # refills: 0   Last office visit provider:  3/17/22     Requested Prescriptions   Pending Prescriptions Disp Refills     vitamin D2 (ERGOCALCIFEROL) 92770 units (1250 mcg) capsule [Pharmacy Med Name: VITAMIN D (ERGOCALCIFE 1.25 MG CAPS] 12 capsule 0     Sig: TAKE 1 CAPSULE (50,000 UNITS) BY MOUTH ONCE A WEEK       There is no refill protocol information for this order          Dahlia Harmon, RN 10/02/22 2:39 PM

## 2022-10-03 RX ORDER — ERGOCALCIFEROL 1.25 MG/1
50000 CAPSULE, LIQUID FILLED ORAL WEEKLY
Qty: 12 CAPSULE | Refills: 0 | Status: SHIPPED | OUTPATIENT
Start: 2022-10-03 | End: 2023-01-13

## 2022-10-21 ENCOUNTER — VIRTUAL VISIT (OUTPATIENT)
Dept: INTERNAL MEDICINE | Facility: CLINIC | Age: 67
End: 2022-10-21
Payer: COMMERCIAL

## 2022-10-21 DIAGNOSIS — I10 PRIMARY HYPERTENSION: ICD-10-CM

## 2022-10-21 DIAGNOSIS — U07.1 INFECTION DUE TO 2019 NOVEL CORONAVIRUS: Primary | ICD-10-CM

## 2022-10-21 DIAGNOSIS — E78.00 HYPERCHOLESTEREMIA: ICD-10-CM

## 2022-10-21 PROCEDURE — 99213 OFFICE O/P EST LOW 20 MIN: CPT | Mod: CS | Performed by: PHYSICIAN ASSISTANT

## 2022-10-21 NOTE — PROGRESS NOTES
"Dahlia is a 67 year old who is being evaluated via a billable video visit.      How would you like to obtain your AVS? MyChart  If the video visit is dropped, the invitation should be resent by: Text to cell phone: [phone]  Will anyone else be joining your video visit? No          Assessment & Plan     Infection due to 2019 novel coronavirus    - nirmatrelvir and ritonavir (PAXLOVID) therapy pack; Take 3 tablets by mouth 2 times daily for 5 days (Take 2 Nirmatrelvir tablets and 1 Ritonavir tablet twice daily for 5 days)    Hypercholesteremia      Primary hypertension                 BMI:   Estimated body mass index is 33.36 kg/m  as calculated from the following:    Height as of 3/17/22: 1.511 m (4' 11.5\").    Weight as of 3/17/22: 76.2 kg (168 lb).   Weight management plan: Patient was referred to their PCP to discuss a diet and exercise plan.    COVID-19 positive patient.  Encounter for consideration of medication intervention. Patient does qualify for a prescription. Full discussion with patient including medication options, risks and benefits. Potential drug interactions reviewed with patient.     Treatment Planned Paxlovid, Rx sent to [place] [facility]  [facility]   [phone] [address] [place] [address]    Hours:  Mon-Fri: 8:00a - 9:00p  Sat-Sun: 9:00a - 5:00p      Temporary change to home medications: YES    Patient Instructions   Stop atorvastatin for the 5 days while on Paxlovid and for an additional 5 days after completing the course of treatment.  Other medications are all ok to continue    Isolation 10/25/2022    Mask wearing through 10/30/2022      Estimated body mass index is 33.36 kg/m  as calculated from the following:    Height as of 3/17/22: 1.511 m (4' 11.5\").    Weight as of 3/17/22: 76.2 kg (168 lb).  GFR Estimate   Date Value Ref Range Status   03/17/2022 90 >60 mL/min/1.73m2 Final     Comment:     Effective December 21, 2021 eGFRcr in adults is calculated using the " 2021 CKD-EPI creatinine equation which includes age and gender (Liam et al., NEJM, DOI: 10.1056/MPVPfo6598450)   08/20/2020 >60 >60 mL/min/1.73m2 Final   02/06/2019 89 >60 mL/min/[1.73_m2] Final     Comment:     Non  GFR Calc  Starting 12/18/2018, serum creatinine based estimated GFR (eGFR) will be   calculated using the Chronic Kidney Disease Epidemiology Collaboration   (CKD-EPI) equation.       Lab Results   Component Value Date    [id number] Negative 01/31/2022       No follow-ups on file.    Marybel Hernández PA-C  [facility]    Ping Villagomez is a 67 year old, presenting for the following health issues:  Suspected Covid (10/21/22 Positive home test)      HPI       COVID-19 Symptom Review  How many days ago did these symptoms start? 10/19/2022    Are any of the following symptoms significant for you?    New or worsening difficulty breathing? No    Worsening cough? Yes, it's a dry cough.     Fever or chills? Yes, the highest temperature was 99.6 F    Headache: YES    Sore throat: No    Chest pain: YES- Only when coughing    Diarrhea: No    Body aches? YES    What treatments has patient tried? Acetaminophen   Does patient live in a nursing home, group home, or shelter? No  Does patient have a way to get food/medications during quarantined? Yes, I have a friend or family member who can help me.                  Review of Systems   Constitutional, HEENT, cardiovascular, pulmonary, gi and gu systems are negative, except as otherwise noted.      Objective           Vitals:  No vitals were obtained today due to virtual visit.    Physical Exam   GENERAL: Healthy, alert and no distress  EYES: Eyes grossly normal to inspection.  No discharge or erythema, or obvious scleral/conjunctival abnormalities.  RESP: No audible wheeze, cough noted , no visible cyanosis.  No visible retractions or increased work of breathing.    SKIN: Visible skin clear. No significant  rash, abnormal pigmentation or lesions.  NEURO: Cranial nerves grossly intact.  Mentation and speech appropriate for age.  PSYCH: Mentation appears normal, affect normal/bright, judgement and insight intact, normal speech and appearance well-groomed.                Video-Visit Details    Video Start Time: 12: 59 pm    Type of service:  Video Visit    Video End Time:1:12 PM    Originating Location (pt. Location): Home        Distant Location (provider location):  On-site    Platform used for Video Visit: Aniyah

## 2022-10-21 NOTE — PATIENT INSTRUCTIONS
Stop atorvastatin for the 5 days while on Paxlovid and for an additional 5 days after completing the course of treatment.  Other medications are all ok to continue    Isolation 10/25/2022    Mask wearing through 10/30/2022

## 2022-10-29 ENCOUNTER — HEALTH MAINTENANCE LETTER (OUTPATIENT)
Age: 67
End: 2022-10-29

## 2022-11-23 ENCOUNTER — IMMUNIZATION (OUTPATIENT)
Dept: FAMILY MEDICINE | Facility: CLINIC | Age: 67
End: 2022-11-23
Payer: COMMERCIAL

## 2022-11-23 PROCEDURE — 90662 IIV NO PRSV INCREASED AG IM: CPT

## 2022-11-23 PROCEDURE — G0008 ADMIN INFLUENZA VIRUS VAC: HCPCS

## 2023-01-13 DIAGNOSIS — E55.9 VITAMIN D DEFICIENCY: ICD-10-CM

## 2023-01-13 RX ORDER — ERGOCALCIFEROL 1.25 MG/1
50000 CAPSULE, LIQUID FILLED ORAL WEEKLY
Qty: 12 CAPSULE | Refills: 0 | Status: SHIPPED | OUTPATIENT
Start: 2023-01-13 | End: 2023-04-19

## 2023-02-07 ENCOUNTER — IMMUNIZATION (OUTPATIENT)
Dept: FAMILY MEDICINE | Facility: CLINIC | Age: 68
End: 2023-02-07
Payer: COMMERCIAL

## 2023-02-07 PROCEDURE — 0124A COVID-19 VACCINE BIVALENT BOOSTER 12+ (PFIZER): CPT

## 2023-02-07 PROCEDURE — 91312 COVID-19 VACCINE BIVALENT BOOSTER 12+ (PFIZER): CPT

## 2023-04-12 DIAGNOSIS — E78.00 HYPERCHOLESTEREMIA: ICD-10-CM

## 2023-04-13 RX ORDER — ATORVASTATIN CALCIUM 10 MG/1
TABLET, FILM COATED ORAL
Qty: 90 TABLET | Refills: 0 | Status: SHIPPED | OUTPATIENT
Start: 2023-04-13 | End: 2023-07-08

## 2023-04-13 ASSESSMENT — ENCOUNTER SYMPTOMS
CHILLS: 0
ABDOMINAL PAIN: 0
FREQUENCY: 0
PALPITATIONS: 0
ARTHRALGIAS: 0
WEAKNESS: 0
HEMATURIA: 0
PARESTHESIAS: 0
SHORTNESS OF BREATH: 0
FEVER: 0
DIARRHEA: 0
SORE THROAT: 0
HEARTBURN: 0
HEADACHES: 0
NAUSEA: 0
COUGH: 0
BREAST MASS: 0
HEMATOCHEZIA: 0
NERVOUS/ANXIOUS: 0
EYE PAIN: 0
DIZZINESS: 0
JOINT SWELLING: 0
CONSTIPATION: 0
DYSURIA: 0
MYALGIAS: 0

## 2023-04-13 ASSESSMENT — ACTIVITIES OF DAILY LIVING (ADL): CURRENT_FUNCTION: NO ASSISTANCE NEEDED

## 2023-04-13 NOTE — TELEPHONE ENCOUNTER
"Routing refill request to provider for review/approval because:  Labs not current:  LDL  Patient needs to be seen because it has been more than 1 year since last office visit.    Last Written Prescription Date:  3/17/2022  Last Fill Quantity: 90,  # refills: 3   Last office visit provider:  3/17/2022     Requested Prescriptions   Pending Prescriptions Disp Refills     atorvastatin (LIPITOR) 10 MG tablet [Pharmacy Med Name: ATORVASTATIN CALCIUM 10MG TABS] 90 tablet 0     Sig: TAKE ONE TABLET BY MOUTH EVERY NIGHT AT BEDTIME       Statins Protocol Failed - 4/12/2023  9:41 AM        Failed - LDL on file in past 12 months     Recent Labs   Lab Test 03/17/22  0818                Passed - No abnormal creatine kinase in past 12 months     No lab results found.             Passed - Recent (12 mo) or future (30 days) visit within the authorizing provider's specialty     Patient has had an office visit with the authorizing provider or a provider within the authorizing providers department within the previous 12 mos or has a future within next 30 days. See \"Patient Info\" tab in inbasket, or \"Choose Columns\" in Meds & Orders section of the refill encounter.              Passed - Medication is active on med list        Passed - Patient is age 18 or older        Passed - No active pregnancy on record        Passed - No positive pregnancy test in past 12 months             Jil Mckeon RN 04/13/23 5:02 AM  "

## 2023-04-19 ENCOUNTER — OFFICE VISIT (OUTPATIENT)
Dept: FAMILY MEDICINE | Facility: CLINIC | Age: 68
End: 2023-04-19
Payer: COMMERCIAL

## 2023-04-19 VITALS
HEART RATE: 59 BPM | RESPIRATION RATE: 16 BRPM | OXYGEN SATURATION: 100 % | BODY MASS INDEX: 31.53 KG/M2 | DIASTOLIC BLOOD PRESSURE: 60 MMHG | WEIGHT: 160.6 LBS | TEMPERATURE: 98 F | SYSTOLIC BLOOD PRESSURE: 144 MMHG | HEIGHT: 60 IN

## 2023-04-19 DIAGNOSIS — M54.16 LUMBAR RADICULOPATHY: Chronic | ICD-10-CM

## 2023-04-19 DIAGNOSIS — K21.9 GASTROESOPHAGEAL REFLUX DISEASE WITHOUT ESOPHAGITIS: ICD-10-CM

## 2023-04-19 DIAGNOSIS — M94.9 DISORDER OF BONE AND CARTILAGE: ICD-10-CM

## 2023-04-19 DIAGNOSIS — Z00.00 ENCOUNTER FOR MEDICARE ANNUAL WELLNESS EXAM: Primary | ICD-10-CM

## 2023-04-19 DIAGNOSIS — Z78.0 ASYMPTOMATIC POSTMENOPAUSAL STATUS: ICD-10-CM

## 2023-04-19 DIAGNOSIS — I10 PRIMARY HYPERTENSION: ICD-10-CM

## 2023-04-19 DIAGNOSIS — E66.811 CLASS 1 OBESITY WITH SERIOUS COMORBIDITY AND BODY MASS INDEX (BMI) OF 31.0 TO 31.9 IN ADULT, UNSPECIFIED OBESITY TYPE: ICD-10-CM

## 2023-04-19 DIAGNOSIS — E78.00 HYPERCHOLESTEREMIA: ICD-10-CM

## 2023-04-19 DIAGNOSIS — Z12.31 ENCOUNTER FOR SCREENING MAMMOGRAM FOR BREAST CANCER: ICD-10-CM

## 2023-04-19 DIAGNOSIS — M89.9 DISORDER OF BONE AND CARTILAGE: ICD-10-CM

## 2023-04-19 DIAGNOSIS — E55.9 VITAMIN D DEFICIENCY: ICD-10-CM

## 2023-04-19 LAB
ALBUMIN SERPL BCG-MCNC: 4.3 G/DL (ref 3.5–5.2)
ALP SERPL-CCNC: 78 U/L (ref 35–104)
ALT SERPL W P-5'-P-CCNC: 20 U/L (ref 10–35)
ANION GAP SERPL CALCULATED.3IONS-SCNC: 11 MMOL/L (ref 7–15)
AST SERPL W P-5'-P-CCNC: 22 U/L (ref 10–35)
BILIRUB SERPL-MCNC: 0.5 MG/DL
BUN SERPL-MCNC: 13.3 MG/DL (ref 8–23)
CALCIUM SERPL-MCNC: 9.7 MG/DL (ref 8.8–10.2)
CHLORIDE SERPL-SCNC: 102 MMOL/L (ref 98–107)
CHOLEST SERPL-MCNC: 159 MG/DL
CREAT SERPL-MCNC: 0.81 MG/DL (ref 0.51–0.95)
DEPRECATED CALCIDIOL+CALCIFEROL SERPL-MC: 59 UG/L (ref 20–75)
DEPRECATED HCO3 PLAS-SCNC: 29 MMOL/L (ref 22–29)
ERYTHROCYTE [DISTWIDTH] IN BLOOD BY AUTOMATED COUNT: 12.3 % (ref 10–15)
GFR SERPL CREATININE-BSD FRML MDRD: 79 ML/MIN/1.73M2
GLUCOSE SERPL-MCNC: 98 MG/DL (ref 70–99)
HCT VFR BLD AUTO: 39.4 % (ref 35–47)
HDLC SERPL-MCNC: 33 MG/DL
HGB BLD-MCNC: 13.1 G/DL (ref 11.7–15.7)
LDLC SERPL CALC-MCNC: 97 MG/DL
MCH RBC QN AUTO: 29 PG (ref 26.5–33)
MCHC RBC AUTO-ENTMCNC: 33.2 G/DL (ref 31.5–36.5)
MCV RBC AUTO: 87 FL (ref 78–100)
NONHDLC SERPL-MCNC: 126 MG/DL
PLATELET # BLD AUTO: 259 10E3/UL (ref 150–450)
POTASSIUM SERPL-SCNC: 3.6 MMOL/L (ref 3.4–5.3)
PROT SERPL-MCNC: 6.9 G/DL (ref 6.4–8.3)
RBC # BLD AUTO: 4.51 10E6/UL (ref 3.8–5.2)
SODIUM SERPL-SCNC: 142 MMOL/L (ref 136–145)
TRIGL SERPL-MCNC: 144 MG/DL
WBC # BLD AUTO: 5.3 10E3/UL (ref 4–11)

## 2023-04-19 PROCEDURE — 85027 COMPLETE CBC AUTOMATED: CPT | Performed by: FAMILY MEDICINE

## 2023-04-19 PROCEDURE — G0438 PPPS, INITIAL VISIT: HCPCS | Performed by: FAMILY MEDICINE

## 2023-04-19 PROCEDURE — 80053 COMPREHEN METABOLIC PANEL: CPT | Performed by: FAMILY MEDICINE

## 2023-04-19 PROCEDURE — 36415 COLL VENOUS BLD VENIPUNCTURE: CPT | Performed by: FAMILY MEDICINE

## 2023-04-19 PROCEDURE — 99214 OFFICE O/P EST MOD 30 MIN: CPT | Mod: 25 | Performed by: FAMILY MEDICINE

## 2023-04-19 PROCEDURE — 82306 VITAMIN D 25 HYDROXY: CPT | Performed by: FAMILY MEDICINE

## 2023-04-19 PROCEDURE — 80061 LIPID PANEL: CPT | Performed by: FAMILY MEDICINE

## 2023-04-19 RX ORDER — GABAPENTIN 600 MG/1
TABLET ORAL
Qty: 90 TABLET | Refills: 3 | Status: SHIPPED | OUTPATIENT
Start: 2023-04-19 | End: 2024-09-23

## 2023-04-19 RX ORDER — ERGOCALCIFEROL 1.25 MG/1
50000 CAPSULE, LIQUID FILLED ORAL WEEKLY
Qty: 12 CAPSULE | Refills: 3 | Status: SHIPPED | OUTPATIENT
Start: 2023-04-19 | End: 2024-03-19

## 2023-04-19 ASSESSMENT — ENCOUNTER SYMPTOMS
JOINT SWELLING: 0
BREAST MASS: 0
HEADACHES: 0
HEARTBURN: 0
FEVER: 0
FREQUENCY: 0
HEMATURIA: 0
SORE THROAT: 0
EYE PAIN: 0
MYALGIAS: 0
NAUSEA: 0
NERVOUS/ANXIOUS: 0
CHILLS: 0
DIARRHEA: 0
DYSURIA: 0
WEAKNESS: 0
ABDOMINAL PAIN: 0
HEMATOCHEZIA: 0
CONSTIPATION: 0
PARESTHESIAS: 0
DIZZINESS: 0
PALPITATIONS: 0
COUGH: 0
SHORTNESS OF BREATH: 0
ARTHRALGIAS: 0

## 2023-04-19 ASSESSMENT — ACTIVITIES OF DAILY LIVING (ADL): CURRENT_FUNCTION: NO ASSISTANCE NEEDED

## 2023-04-19 NOTE — PROGRESS NOTES
"SUBJECTIVE:   Dahlia is a 67 year old who presents for Preventive Visit.      4/19/2023     8:26 AM   Additional Questions   Roomed by    Patient has been advised of split billing requirements and indicates understanding: Yes  Are you in the first 12 months of your Medicare coverage?  No    Healthy Habits:     In general, how would you rate your overall health?  Good    Frequency of exercise:  1 day/week    Duration of exercise:  15-30 minutes    Do you usually eat at least 4 servings of fruit and vegetables a day, include whole grains    & fiber and avoid regularly eating high fat or \"junk\" foods?  Yes    Taking medications regularly:  Yes    Medication side effects:  None    Ability to successfully perform activities of daily living:  No assistance needed    Home Safety:  No safety concerns identified    Hearing Impairment:  No hearing concerns    In the past 6 months, have you been bothered by leaking of urine?  No    In general, how would you rate your overall mental or emotional health?  Excellent      PHQ-2 Total Score: 0    Additional concerns today:  No      Have you ever done Advance Care Planning? (For example, a Health Directive, POLST, or a discussion with a medical provider or your loved ones about your wishes): No, advance care planning information given to patient to review.  Advanced care planning was discussed at today's visit.       Fall risk  Fallen 2 or more times in the past year?: No  Any fall with injury in the past year?: No    Cognitive Screening   1) Repeat 3 items (Leader, Season, Table)    2) Clock draw: NORMAL  3) 3 item recall: Recalls 3 objects  Results: NORMAL clock, 1-2 items recalled: COGNITIVE IMPAIRMENT LESS LIKELY    Mini-CogTM Copyright MARY Glover. Licensed by the author for use in [facility]; reprinted with permission (soham@.[email or web address]). All rights reserved.      Do you have sleep apnea, excessive snoring or daytime drowsiness?: no    Reviewed and updated as needed this " visit by clinical staff   Tobacco  Allergies  Meds              Reviewed and updated as needed this visit by Provider                 Social History     Tobacco Use     Smoking status: Never     Smokeless tobacco: Never   Vaping Use     Vaping status: Not on file   Substance Use Topics     Alcohol use: No             4/13/2023    11:48 AM   Alcohol Use   Prescreen: >3 drinks/day or >7 drinks/week? No     Do you have a current opioid prescription? No  Do you use any other controlled substances or medications that are not prescribed by a provider? None              Current providers sharing in care for this patient include:   Patient Care Team:  Milady Awan MD as PCP - General (Family Practice)  Milady Awan MD as Assigned PCP    The following health maintenance items are reviewed in Epic and correct as of today:  Health Maintenance   Topic Date Due     MEDICARE ANNUAL WELLNESS VISIT  03/17/2023     ANNUAL REVIEW OF HM ORDERS  03/17/2023     MAMMO SCREENING  03/21/2024     FALL RISK ASSESSMENT  04/19/2024     LIPID  03/17/2027     ADVANCE CARE PLANNING  03/17/2027     COLORECTAL CANCER SCREENING  05/14/2029     DTAP/TDAP/TD IMMUNIZATION (3 - Td or Tdap) 03/17/2032     DEXA  04/23/2034     HEPATITIS C SCREENING  Completed     PHQ-2 (once per calendar year)  Completed     INFLUENZA VACCINE  Completed     Pneumococcal Vaccine: 65+ Years  Completed     ZOSTER IMMUNIZATION  Completed     COVID-19 Vaccine  Completed     IPV IMMUNIZATION  Aged Out     MENINGITIS IMMUNIZATION  Aged Out     Lab work is in process          3/15/2022    12:15 PM 3/21/2022     1:51 PM   Breast CA Risk Assessment (FHS-7)   Do you have a family history of breast, colon, or ovarian cancer? No / Unknown No / Unknown         Mammogram Screening: Recommended mammography every 1-2 years with patient discussion and risk factor consideration  Pertinent mammograms are reviewed under the imaging tab.    Review of Systems   Constitutional: Negative  "for chills and fever.   HENT: Negative for congestion, ear pain, hearing loss and sore throat.    Eyes: Negative for pain and visual disturbance.   Respiratory: Negative for cough and shortness of breath.    Cardiovascular: Negative for chest pain, palpitations and peripheral edema.   Gastrointestinal: Negative for abdominal pain, constipation, diarrhea, heartburn, hematochezia and nausea.   Breasts:  Negative for tenderness, breast mass and discharge.   Genitourinary: Negative for dysuria, frequency, genital sores, hematuria, pelvic pain, urgency, vaginal bleeding and vaginal discharge.   Musculoskeletal: Negative for arthralgias, joint swelling and myalgias.   Skin: Negative for rash.   Neurological: Negative for dizziness, weakness, headaches and paresthesias.   Psychiatric/Behavioral: Negative for mood changes. The patient is not nervous/anxious.          OBJECTIVE:   BP (!) 144/60   Pulse 59   Temp 98  F (36.7  C)   Resp 16   Ht 1.511 m (4' 11.5\")   Wt 72.8 kg (160 lb 9.6 oz)   LMP  (LMP Unknown)   SpO2 100%   BMI 31.89 kg/m   Estimated body mass index is 31.89 kg/m  as calculated from the following:    Height as of this encounter: 1.511 m (4' 11.5\").    Weight as of this encounter: 72.8 kg (160 lb 9.6 oz).  Physical Exam  GENERAL APPEARANCE: healthy, alert and no distress  EYES: Eyes grossly normal to inspection, PERRL and conjunctivae and sclerae normal  HENT: ear canals and TM's normal, nose and mouth without ulcers or lesions, oropharynx clear and oral mucous membranes moist  NECK: no adenopathy, no asymmetry, masses, or scars and thyroid normal to palpation  RESP: lungs clear to auscultation - no rales, rhonchi or wheezes  BREAST: normal without masses, tenderness or nipple discharge and no palpable axillary masses or adenopathy  CV: regular rate and rhythm, normal S1 S2, no S3 or S4, no murmur, click or rub, no peripheral edema and peripheral pulses strong  ABDOMEN: soft, nontender, no " hepatosplenomegaly, no masses and bowel sounds normal  MS: no musculoskeletal defects are noted and gait is age appropriate without ataxia  SKIN: no suspicious lesions or rashes  NEURO: Normal strength and tone, sensory exam grossly normal, mentation intact and speech normal  PSYCH: mentation appears normal and affect normal/bright    Diagnostic Test Results:  Labs reviewed in Epic  Results for orders placed or performed in visit on 04/19/23 (from the past 24 hour(s))   CBC with platelets   Result Value Ref Range    WBC Count 5.3 4.0 - 11.0 10e3/uL    RBC Count 4.51 3.80 - 5.20 10e6/uL    Hemoglobin 13.1 11.7 - 15.7 g/dL    Hematocrit 39.4 35.0 - 47.0 %    MCV 87 78 - 100 fL    MCH 29.0 26.5 - 33.0 pg    MCHC 33.2 31.5 - 36.5 g/dL    RDW 12.3 10.0 - 15.0 %    Platelet Count 259 150 - 450 10e3/uL       ASSESSMENT / PLAN:   1. Encounter for Medicare annual wellness exam  Dahlia presents for her annual wellness exam.  As far as healthcare maintenance, her next colonoscopy is due in 2029.  She is still doing yearly mammogram so is due.  She is due for her bone density, her last was 2019.  She is up-to-date on immunizations.  - PRIMARY CARE FOLLOW-UP SCHEDULING; Future    2. Lumbar radiculopathy  She is taking gabapentin and that is been working well for her.  - gabapentin (NEURONTIN) 600 MG tablet; TAKE ONE HALF TO ONE TABLET (300-600 MG) BY MOUTH EVERY NIGHT AT BEDTIME AS NEEDED  Dispense: 90 tablet; Refill: 3    3. Hypercholesteremia  Her cholesterols been stable on her current dose of statin.  She due for fasting blood work today.  - CBC with platelets; Future  - Comprehensive metabolic panel; Future  - Lipid panel reflex to direct LDL Fasting; Future  - CBC with platelets  - Comprehensive metabolic panel  - Lipid panel reflex to direct LDL Fasting    4. Osteopenia  She has had some mild osteopenia in the past and is due to recheck her bone density.  She is good about calcium and has been taking prescription strength  vitamin D.  She getting regular exercise.    5. Asymptomatic postmenopausal status  - DX Hip/Pelvis/Spine; Future    6. Primary hypertension  Under good control with her current medications lisinopril/hydrochlorothiazide 20/12.5.    7. Gastroesophageal reflux disease without esophagitis  Stable    8. Class 1 obesity with serious comorbidity and body mass index (BMI) of 31.0 to 31.9 in adult, unspecified obesity type  Comorbidities include hypertension, hyperlipidemia, and reflux.    9. Vitamin D deficiency  She has continued on the high-dose vitamin D and her vitamin D's have remained in a normal range.  - vitamin D2 (ERGOCALCIFEROL) 91550 units (1250 mcg) capsule; Take 1 capsule (50,000 Units) by mouth once a week  Dispense: 12 capsule; Refill: 3  - Vitamin D Deficiency; Future  - Vitamin D Deficiency    10. Encounter for screening mammogram for breast cancer  - *MA Screening Digital Bilateral; Future    Patient has been advised of split billing requirements and indicates understanding: Yes      COUNSELING:  Reviewed preventive health counseling, as reflected in patient instructions       Regular exercise       Healthy diet/nutrition       Osteoporosis prevention/bone health        She reports that she has never smoked. She has never used smokeless tobacco.      Appropriate preventive services were discussed with this patient, including applicable screening as appropriate for cardiovascular disease, diabetes, osteopenia/osteoporosis, and glaucoma.  As appropriate for age/gender, discussed screening for colorectal cancer, prostate cancer, breast cancer, and cervical cancer. Checklist reviewing preventive services available has been given to the patient.    Reviewed patients plan of care and provided an AVS. The Basic Care Plan (routine screening as documented in Health Maintenance) for Dahlia meets the Care Plan requirement. This Care Plan has been established and reviewed with the Patient.      Milady Awan MD  M  [facility]    Identified Health Risks:    I have reviewed Opioid Use Disorder and Substance Use Disorder risk factors and made any needed referrals.

## 2023-04-19 NOTE — PATIENT INSTRUCTIONS
Patient Education   Personalized Prevention Plan  You are due for the preventive services outlined below.  Your care team is available to assist you in scheduling these services.  If you have already completed any of these items, please share that information with your care team to update in your medical record.  Health Maintenance Due   Topic Date Due     Annual Wellness Visit  03/17/2023     ANNUAL REVIEW OF HM ORDERS  03/17/2023       Exercise for a Healthier Heart  You may wonder how you can improve the health of your heart. If you re thinking about exercise, you re on the right track. You don t need to become an athlete. But you do need a certain amount of brisk exercise to help strengthen your heart. If you have been diagnosed with a heart condition, your healthcare provider may advise exercise to help your condition. To help make exercise a habit, choose safe, fun activities.      Exercise with a friend. When activity is fun, you're more likely to stick with it.     Before you start  Check with your healthcare provider before starting an exercise program. This is especially important if you haven't been active for a while. It's also important if you have a long-term (chronic) health problem such as heart disease, diabetes, or obesity. Also check with your provider if you're at high risk for having these problems.   Why exercise?  Exercising regularly offers many healthy rewards. It can help you do all of these:     Improve your blood cholesterol level to help prevent further heart trouble.    Lower your blood pressure to help prevent a stroke or heart attack.    Control diabetes or reduce your risk of getting this disease.    Improve your heart and lung function.    Reach and stay at a healthy weight.    Make your muscles stronger so you can stay active.    Prevent falls and fractures by slowing the loss of bone mass (osteoporosis).    Manage stress better.    Improve your sense of self and your body  image.  Exercise tips      Ease into your routine. Set small goals. Then build on them. Talk with your healthcare provider first before starting an exercise routine if you're not sure what your activity level should be.    Exercise on most days. Aim for a total of at least 150 minutes (2 hours and 30 minutes) or more of moderate-intensity aerobic activity each week. You could also do 75 minutes (1 hour and 15 minutes) or more of vigorous-intensity aerobic activity each week. Or try for a combination of both. Moderate activity means that you breathe heavier and your heart rate increases, but you can still talk. Think about doing at least 30 minutes of moderate exercise, 5 times a week. It's OK to work up to the 30-minute period over time. Examples of moderate-intensity activity are brisk walking, gardening, and water aerobics.    Step up your daily activity level.  Along with your exercise program, try being more active the whole day. Walk instead of drive. Or park further away so that you take more steps each day. Do more household tasks or yard work. You may not be able to meet the advised amount of physical activity. But doing some moderate- or vigorous-intensity aerobic activity can help reduce your risk for heart disease. Your healthcare provider can help you figure out what is best for you.    Choose 1 or more activities you enjoy.  Walking is one of the easiest things you can do. You can also try swimming, riding a bike, dancing, or taking an exercise class.    Call 911  Call 911 right away if any of these occur:     Chest pain that doesn't go away quickly with rest    New burning, tightness, pressure, or heaviness in your chest, neck, shoulders, back, or arms    Abnormal or severe shortness of breath    A very fast or irregular heartbeat (palpitations)    Fainting  When to call your healthcare provider  Call your healthcare provider if you have any of these:     Dizziness or lightheadedness    Mild shortness  of breath or chest pain    Increased or new joint or muscle pain    Verónica last reviewed this educational content on 7/1/2022 2000-2022 [employer]. All rights reserved. This information is not intended as a substitute for professional medical care. Always follow your healthcare professional's instructions.

## 2023-04-19 NOTE — PROGRESS NOTES
She is at risk for lack of exercise and has been provided with information to increase physical activity for the benefit of her well-being.

## 2023-04-26 ENCOUNTER — TELEPHONE (OUTPATIENT)
Dept: FAMILY MEDICINE | Facility: CLINIC | Age: 68
End: 2023-04-26
Payer: COMMERCIAL

## 2023-04-26 NOTE — TELEPHONE ENCOUNTER
LMTCB    Vision was missed at recent AWV. Was not aware that it was [facility]  Please ask if she will come back for Nurse only visit.

## 2023-04-27 ENCOUNTER — ANCILLARY PROCEDURE (OUTPATIENT)
Dept: MAMMOGRAPHY | Facility: CLINIC | Age: 68
End: 2023-04-27
Attending: FAMILY MEDICINE
Payer: COMMERCIAL

## 2023-04-27 DIAGNOSIS — Z12.31 ENCOUNTER FOR SCREENING MAMMOGRAM FOR BREAST CANCER: ICD-10-CM

## 2023-04-27 PROCEDURE — 77067 SCR MAMMO BI INCL CAD: CPT

## 2023-04-28 NOTE — TELEPHONE ENCOUNTER
Patient states that she goes to [facility] eye [facility] for regular vision tests.    But she said that she will come back in for a nurse visit only if its required.    Patient requests a call back only if she NEEDs to come back in for nurse only eye check and to leave a DTM with all of the info.

## 2023-05-03 ENCOUNTER — ALLIED HEALTH/NURSE VISIT (OUTPATIENT)
Dept: FAMILY MEDICINE | Facility: CLINIC | Age: 68
End: 2023-05-03
Payer: COMMERCIAL

## 2023-05-03 DIAGNOSIS — Z00.00 ENCOUNTER FOR MEDICARE ANNUAL WELLNESS EXAM: Primary | ICD-10-CM

## 2023-05-03 PROCEDURE — 99207 PR NO CHARGE NURSE ONLY: CPT

## 2023-05-03 NOTE — PROGRESS NOTES
VISION   Wears glasses: worn for testing  Tool used: Godoy   Right eye:        10/10 (20/20)  Left eye:          10/10 (20/20)  Visual Acuity: Pass

## 2023-05-11 DIAGNOSIS — I10 PRIMARY HYPERTENSION: ICD-10-CM

## 2023-05-11 NOTE — TELEPHONE ENCOUNTER
"Routing refill request to provider for review/approval because:  BP out of range    Last Written Prescription Date:  03/17/2022  Last Fill Quantity: 90,  # refills: 3   Last office visit provider:  04/19/2023     Requested Prescriptions   Pending Prescriptions Disp Refills     lisinopril-hydrochlorothiazide (ZESTORETIC) 20-12.5 MG tablet [Pharmacy Med Name: LISINOPRIL-HYDROCHLORO 20-12.5 TABS] 90 tablet 1     Sig: TAKE 1 TABLET BY MOUTH DAILY       Diuretics (Including Combos) Protocol Failed - 5/11/2023  5:03 AM        Failed - Blood pressure under 140/90 in past 12 months     BP Readings from Last 3 Encounters:   04/19/23 (!) 144/60   03/17/22 (!) 141/61 08/20/20 128/60                 Passed - Recent (12 mo) or future (30 days) visit within the authorizing provider's specialty     Patient has had an office visit with the authorizing provider or a provider within the authorizing providers department within the previous 12 mos or has a future within next 30 days. See \"Patient Info\" tab in inbasket, or \"Choose Columns\" in Meds & Orders section of the refill encounter.              Passed - Medication is active on med list        Passed - Patient is age 18 or older        Passed - No active pregancy on record        Passed - Normal serum creatinine on file in past 12 months     Recent Labs   Lab Test 04/19/23  0922   CR 0.81              Passed - Normal serum potassium on file in past 12 months     Recent Labs   Lab Test 04/19/23  0922   POTASSIUM 3.6                    Passed - Normal serum sodium on file in past 12 months     Recent Labs   Lab Test 04/19/23  0922                 Passed - No positive pregnancy test in past 12 months       ACE Inhibitors (Including Combos) Protocol Failed - 5/11/2023  5:03 AM        Failed - Blood pressure under 140/90 in past 12 months     BP Readings from Last 3 Encounters:   04/19/23 (!) 144/60   03/17/22 (!) 141/61   08/20/20 128/60                 Passed - Recent (12 mo) " "or future (30 days) visit within the authorizing provider's specialty     Patient has had an office visit with the authorizing provider or a provider within the authorizing providers department within the previous 12 mos or has a future within next 30 days. See \"Patient Info\" tab in inbasket, or \"Choose Columns\" in Meds & Orders section of the refill encounter.              Passed - Medication is active on med list        Passed - Patient is age 18 or older        Passed - No active pregnancy on record        Passed - Normal serum creatinine on file in past 12 months     Recent Labs   Lab Test 04/19/23  0922   CR 0.81       Ok to refill medication if creatinine is low          Passed - Normal serum potassium on file in past 12 months     Recent Labs   Lab Test 04/19/23  0922   POTASSIUM 3.6             Passed - No positive pregnancy test within past 12 months             Nat Ugarte RN 05/11/23 3:28 PM  "

## 2023-05-12 RX ORDER — LISINOPRIL AND HYDROCHLOROTHIAZIDE 12.5; 2 MG/1; MG/1
1 TABLET ORAL DAILY
Qty: 90 TABLET | Refills: 1 | Status: SHIPPED | OUTPATIENT
Start: 2023-05-12 | End: 2023-11-02

## 2023-05-18 ENCOUNTER — HOSPITAL ENCOUNTER (OUTPATIENT)
Dept: BONE DENSITY | Facility: HOSPITAL | Age: 68
Discharge: HOME | End: 2023-05-18
Attending: FAMILY MEDICINE | Admitting: FAMILY MEDICINE
Payer: COMMERCIAL

## 2023-05-18 DIAGNOSIS — Z78.0 ASYMPTOMATIC POSTMENOPAUSAL STATUS: ICD-10-CM

## 2023-05-18 PROCEDURE — 77080 DXA BONE DENSITY AXIAL: CPT

## 2023-07-08 DIAGNOSIS — E78.00 HYPERCHOLESTEREMIA: ICD-10-CM

## 2023-07-08 RX ORDER — ATORVASTATIN CALCIUM 10 MG/1
TABLET, FILM COATED ORAL
Qty: 90 TABLET | Refills: 3 | Status: SHIPPED | OUTPATIENT
Start: 2023-07-08

## 2023-07-08 NOTE — TELEPHONE ENCOUNTER
"Last Written Prescription Date:  4/13/2023  Last Fill Quantity: 90,  # refills: 0   Last office visit provider:  4/19/2023     Requested Prescriptions   Pending Prescriptions Disp Refills     atorvastatin (LIPITOR) 10 MG tablet [Pharmacy Med Name: ATORVASTATIN CALCIUM 10MG TABS] 90 tablet 0     Sig: TAKE ONE TABLET BY MOUTH EVERY NIGHT AT BEDTIME       Statins Protocol Passed - 7/8/2023  5:04 AM        Passed - LDL on file in past 12 months     Recent Labs   Lab Test 04/19/23  0922   LDL 97             Passed - No abnormal creatine kinase in past 12 months     No lab results found.             Passed - Recent (12 mo) or future (30 days) visit within the authorizing provider's specialty     Patient has had an office visit with the authorizing provider or a provider within the authorizing providers department within the previous 12 mos or has a future within next 30 days. See \"Patient Info\" tab in inbasket, or \"Choose Columns\" in Meds & Orders section of the refill encounter.              Passed - Medication is active on med list        Passed - Patient is age 18 or older        Passed - No active pregnancy on record        Passed - No positive pregnancy test in past 12 months             Mabel Badillo RN 07/08/23 1:55 PM  "

## 2023-07-10 DIAGNOSIS — E78.00 HYPERCHOLESTEREMIA: ICD-10-CM

## 2023-07-10 RX ORDER — ATORVASTATIN CALCIUM 10 MG/1
TABLET, FILM COATED ORAL
Qty: 90 TABLET | Refills: 0 | OUTPATIENT
Start: 2023-07-10

## 2023-07-10 NOTE — TELEPHONE ENCOUNTER
Refused as duplicate request  Filled 7/8/2023 disp 90 with 3 refills  Mabel Badillo RN   07/10/23 9:26 AM  [facility] Nurse Advisor

## 2023-07-11 DIAGNOSIS — E78.00 HYPERCHOLESTEREMIA: ICD-10-CM

## 2023-07-11 RX ORDER — ATORVASTATIN CALCIUM 10 MG/1
TABLET, FILM COATED ORAL
Qty: 90 TABLET | Refills: 0 | OUTPATIENT
Start: 2023-07-11

## 2023-07-12 DIAGNOSIS — E78.00 HYPERCHOLESTEREMIA: ICD-10-CM

## 2023-07-12 RX ORDER — ATORVASTATIN CALCIUM 10 MG/1
TABLET, FILM COATED ORAL
Qty: 90 TABLET | Refills: 0 | OUTPATIENT
Start: 2023-07-12

## 2023-07-20 ENCOUNTER — MYC MEDICAL ADVICE (OUTPATIENT)
Dept: FAMILY MEDICINE | Facility: CLINIC | Age: 68
End: 2023-07-20
Payer: COMMERCIAL

## 2023-07-20 DIAGNOSIS — M94.9 DISORDER OF BONE AND CARTILAGE: Primary | ICD-10-CM

## 2023-07-20 DIAGNOSIS — M89.9 DISORDER OF BONE AND CARTILAGE: Primary | ICD-10-CM

## 2023-08-04 ENCOUNTER — LAB (OUTPATIENT)
Dept: LAB | Facility: CLINIC | Age: 68
End: 2023-08-04
Payer: COMMERCIAL

## 2023-08-04 DIAGNOSIS — M89.9 DISORDER OF BONE AND CARTILAGE: ICD-10-CM

## 2023-08-04 DIAGNOSIS — M94.9 DISORDER OF BONE AND CARTILAGE: ICD-10-CM

## 2023-08-04 LAB
DEPRECATED CALCIDIOL+CALCIFEROL SERPL-MC: 58 UG/L (ref 20–75)
PTH-INTACT SERPL-MCNC: 39 PG/ML (ref 15–65)
TOTAL PROTEIN SERUM FOR ELP: 6.2 G/DL (ref 6.4–8.3)

## 2023-08-04 PROCEDURE — 36415 COLL VENOUS BLD VENIPUNCTURE: CPT

## 2023-08-04 PROCEDURE — 84165 PROTEIN E-PHORESIS SERUM: CPT

## 2023-08-04 PROCEDURE — 84155 ASSAY OF PROTEIN SERUM: CPT

## 2023-08-04 PROCEDURE — 82306 VITAMIN D 25 HYDROXY: CPT

## 2023-08-04 PROCEDURE — 83970 ASSAY OF PARATHORMONE: CPT

## 2023-08-06 PROCEDURE — 81050 URINALYSIS VOLUME MEASURE: CPT

## 2023-08-06 PROCEDURE — 82340 ASSAY OF CALCIUM IN URINE: CPT

## 2023-08-07 LAB
ALBUMIN SERPL ELPH-MCNC: 3.9 G/DL (ref 3.7–5.1)
ALPHA1 GLOB SERPL ELPH-MCNC: 0.3 G/DL (ref 0.2–0.4)
ALPHA2 GLOB SERPL ELPH-MCNC: 0.6 G/DL (ref 0.5–0.9)
B-GLOBULIN SERPL ELPH-MCNC: 0.7 G/DL (ref 0.6–1)
CALCIUM 24H UR-MRATE: 0.09 G/SPEC (ref 0.1–0.3)
CALCIUM UR-MCNC: 8.6 MG/DL
COLLECT DURATION TIME UR: 24 H
GAMMA GLOB SERPL ELPH-MCNC: 0.8 G/DL (ref 0.7–1.6)
M PROTEIN SERPL ELPH-MCNC: 0 G/DL
PROT PATTERN SERPL ELPH-IMP: NORMAL
SPECIMEN VOL UR: 1000 ML

## 2023-08-08 DIAGNOSIS — M89.9 DISORDER OF BONE AND CARTILAGE: Primary | ICD-10-CM

## 2023-08-08 DIAGNOSIS — M94.9 DISORDER OF BONE AND CARTILAGE: Primary | ICD-10-CM

## 2023-08-08 RX ORDER — ALENDRONATE SODIUM 70 MG/1
70 TABLET ORAL
Qty: 12 TABLET | Refills: 3 | Status: SHIPPED | OUTPATIENT
Start: 2023-08-08

## 2023-10-23 ENCOUNTER — IMMUNIZATION (OUTPATIENT)
Dept: FAMILY MEDICINE | Facility: CLINIC | Age: 68
End: 2023-10-23
Payer: COMMERCIAL

## 2023-10-23 PROCEDURE — G0008 ADMIN INFLUENZA VIRUS VAC: HCPCS | Mod: 59

## 2023-10-23 PROCEDURE — 91320 SARSCV2 VAC 30MCG TRS-SUC IM: CPT

## 2023-10-23 PROCEDURE — 90662 IIV NO PRSV INCREASED AG IM: CPT

## 2023-10-23 PROCEDURE — 90480 ADMN SARSCOV2 VAC 1/ONLY CMP: CPT

## 2023-11-02 DIAGNOSIS — I10 PRIMARY HYPERTENSION: ICD-10-CM

## 2023-11-02 RX ORDER — LISINOPRIL AND HYDROCHLOROTHIAZIDE 12.5; 2 MG/1; MG/1
1 TABLET ORAL DAILY
Qty: 90 TABLET | Refills: 1 | Status: SHIPPED | OUTPATIENT
Start: 2023-11-02 | End: 2024-04-25

## 2024-03-19 DIAGNOSIS — E55.9 VITAMIN D DEFICIENCY: ICD-10-CM

## 2024-03-19 RX ORDER — ERGOCALCIFEROL 1.25 MG/1
50000 CAPSULE, LIQUID FILLED ORAL WEEKLY
Qty: 12 CAPSULE | Refills: 3 | Status: SHIPPED | OUTPATIENT
Start: 2024-03-19

## 2024-03-20 ENCOUNTER — PATIENT OUTREACH (OUTPATIENT)
Dept: CARE COORDINATION | Facility: CLINIC | Age: 69
End: 2024-03-20
Payer: COMMERCIAL

## 2024-04-03 ENCOUNTER — PATIENT OUTREACH (OUTPATIENT)
Dept: CARE COORDINATION | Facility: CLINIC | Age: 69
End: 2024-04-03
Payer: COMMERCIAL

## 2024-04-25 DIAGNOSIS — I10 PRIMARY HYPERTENSION: ICD-10-CM

## 2024-04-25 RX ORDER — LISINOPRIL AND HYDROCHLOROTHIAZIDE 12.5; 2 MG/1; MG/1
1 TABLET ORAL DAILY
Qty: 90 TABLET | Refills: 1 | Status: SHIPPED | OUTPATIENT
Start: 2024-04-25

## 2024-05-27 SDOH — HEALTH STABILITY: PHYSICAL HEALTH: ON AVERAGE, HOW MANY MINUTES DO YOU ENGAGE IN EXERCISE AT THIS LEVEL?: 30 MIN

## 2024-05-27 SDOH — HEALTH STABILITY: PHYSICAL HEALTH: ON AVERAGE, HOW MANY DAYS PER WEEK DO YOU ENGAGE IN MODERATE TO STRENUOUS EXERCISE (LIKE A BRISK WALK)?: 3 DAYS

## 2024-05-27 ASSESSMENT — SOCIAL DETERMINANTS OF HEALTH (SDOH): HOW OFTEN DO YOU GET TOGETHER WITH FRIENDS OR RELATIVES?: TWICE A WEEK

## 2024-05-30 ENCOUNTER — OFFICE VISIT (OUTPATIENT)
Dept: FAMILY MEDICINE | Facility: CLINIC | Age: 69
End: 2024-05-30
Attending: FAMILY MEDICINE
Payer: COMMERCIAL

## 2024-05-30 VITALS
BODY MASS INDEX: 33.02 KG/M2 | DIASTOLIC BLOOD PRESSURE: 62 MMHG | WEIGHT: 163.8 LBS | HEIGHT: 59 IN | OXYGEN SATURATION: 98 % | RESPIRATION RATE: 16 BRPM | HEART RATE: 61 BPM | SYSTOLIC BLOOD PRESSURE: 132 MMHG | TEMPERATURE: 98 F

## 2024-05-30 DIAGNOSIS — M54.16 LUMBAR RADICULOPATHY: Chronic | ICD-10-CM

## 2024-05-30 DIAGNOSIS — I10 PRIMARY HYPERTENSION: ICD-10-CM

## 2024-05-30 DIAGNOSIS — R05.8 POST-VIRAL COUGH SYNDROME: ICD-10-CM

## 2024-05-30 DIAGNOSIS — E78.00 HYPERCHOLESTEREMIA: ICD-10-CM

## 2024-05-30 DIAGNOSIS — Z00.00 ENCOUNTER FOR MEDICARE ANNUAL WELLNESS EXAM: Primary | ICD-10-CM

## 2024-05-30 DIAGNOSIS — K21.9 GASTROESOPHAGEAL REFLUX DISEASE WITHOUT ESOPHAGITIS: ICD-10-CM

## 2024-05-30 DIAGNOSIS — G89.29 CHRONIC RIGHT SHOULDER PAIN: ICD-10-CM

## 2024-05-30 DIAGNOSIS — M25.511 CHRONIC RIGHT SHOULDER PAIN: ICD-10-CM

## 2024-05-30 DIAGNOSIS — E66.811 CLASS 1 OBESITY WITH SERIOUS COMORBIDITY AND BODY MASS INDEX (BMI) OF 32.0 TO 32.9 IN ADULT, UNSPECIFIED OBESITY TYPE: ICD-10-CM

## 2024-05-30 DIAGNOSIS — M81.0 AGE-RELATED OSTEOPOROSIS WITHOUT CURRENT PATHOLOGICAL FRACTURE: ICD-10-CM

## 2024-05-30 LAB
ERYTHROCYTE [DISTWIDTH] IN BLOOD BY AUTOMATED COUNT: 13 % (ref 10–15)
HCT VFR BLD AUTO: 41.3 % (ref 35–47)
HGB BLD-MCNC: 13.6 G/DL (ref 11.7–15.7)
MCH RBC QN AUTO: 29.2 PG (ref 26.5–33)
MCHC RBC AUTO-ENTMCNC: 32.9 G/DL (ref 31.5–36.5)
MCV RBC AUTO: 89 FL (ref 78–100)
PLATELET # BLD AUTO: 251 10E3/UL (ref 150–450)
RBC # BLD AUTO: 4.65 10E6/UL (ref 3.8–5.2)
WBC # BLD AUTO: 4.8 10E3/UL (ref 4–11)

## 2024-05-30 PROCEDURE — G0439 PPPS, SUBSEQ VISIT: HCPCS | Performed by: FAMILY MEDICINE

## 2024-05-30 PROCEDURE — 36415 COLL VENOUS BLD VENIPUNCTURE: CPT | Performed by: FAMILY MEDICINE

## 2024-05-30 PROCEDURE — 99214 OFFICE O/P EST MOD 30 MIN: CPT | Mod: 25 | Performed by: FAMILY MEDICINE

## 2024-05-30 PROCEDURE — 80053 COMPREHEN METABOLIC PANEL: CPT | Performed by: FAMILY MEDICINE

## 2024-05-30 PROCEDURE — 85027 COMPLETE CBC AUTOMATED: CPT | Performed by: FAMILY MEDICINE

## 2024-05-30 PROCEDURE — 80061 LIPID PANEL: CPT | Performed by: FAMILY MEDICINE

## 2024-05-30 RX ORDER — BUDESONIDE AND FORMOTEROL FUMARATE DIHYDRATE 160; 4.5 UG/1; UG/1
2 AEROSOL RESPIRATORY (INHALATION) 2 TIMES DAILY
Qty: 10.2 G | Refills: 0 | Status: SHIPPED | OUTPATIENT
Start: 2024-05-30

## 2024-05-30 RX ORDER — IBANDRONATE SODIUM 150 MG/1
150 TABLET, FILM COATED ORAL
Qty: 3 TABLET | Refills: 3 | Status: SHIPPED | OUTPATIENT
Start: 2024-05-30

## 2024-05-30 NOTE — PATIENT INSTRUCTIONS
"Preventive Care Advice   This is general advice we often give to help people stay healthy. Your care team may have specific advice just for you. Please talk to your care team about your own preventive care needs.  Lifestyle  Exercise at least 150 minutes each week (30 minutes a day, 5 days a week).  Do muscle strengthening activities 2 days a week. These help control your weight and prevent disease.  No smoking.  Wear sunscreen to prevent skin cancer.  Have your home tested for radon every 2 to 5 years. Radon is a colorless, odorless gas that can harm your lungs. To learn more, go to www.[email or web address]. and search for \"Radon in Homes.\"  Keep guns unloaded and locked up in a safe place like a safe or gun vault, or, use a gun lock and hide the keys. Always lock away bullets separately. To learn more, visit [employer].[place].gov and search for \"safe gun storage.\"  Nutrition  Eat 5 or more servings of fruits and vegetables each day.  Try wheat bread, brown rice and whole grain pasta (instead of white bread, rice, and pasta).  Get enough calcium and vitamin D. Check the label on foods and aim for 100% of the RDA (recommended daily allowance).  Regular exams  Have a dental exam and cleaning every 6 months.  See your health care team every year to talk about:  Any changes in your health.  Any medicines your care team has prescribed.  Preventive care, family planning, and ways to prevent chronic diseases.  Shots (vaccines)   HPV shots (up to age 26), if you've never had them before.  Hepatitis B shots (up to age 59), if you've never had them before.  COVID-19 shot: Get this shot when it's due.  Flu shot: Get a flu shot every year.  Tetanus shot: Get a tetanus shot every 10 years.  Pneumococcal, hepatitis A, and RSV shots: Ask your care team if you need these based on your risk.  Shingles shot (for age 50 and up).  General health tests  Diabetes screening:  Starting at age 35, Get screened for diabetes at least every 3 years.  If " you are younger than age 35, ask your care team if you should be screened for diabetes.  Cholesterol test: At age 39, start having a cholesterol test every 5 years, or more often if advised.  Bone density scan (DEXA): At age 50, ask your care team if you should have this scan for osteoporosis (brittle bones).  Hepatitis C: Get tested at least once in your life.  Abdominal aortic aneurysm screening: Talk to your doctor about having this screening if you:  Have ever smoked; and  Are biologically male; and  Are between the ages of 65 and 75.  STIs (sexually transmitted infections)  Before age 24: Ask your care team if you should be screened for STIs.  After age 24: Get screened for STIs if you're at risk. You are at risk for STIs (including HIV) if:  You are sexually active with more than one person.  You don't use condoms every time.  You or a partner was diagnosed with a sexually transmitted infection.  If you are at risk for HIV, ask about PrEP medicine to prevent HIV.  Get tested for HIV at least once in your life, whether you are at risk for HIV or not.  Cancer screening tests  Cervical cancer screening: If you have a cervix, begin getting regular cervical cancer screening tests at age 21. Most people who have regular screenings with normal results can stop after age 65. Talk about this with your provider.  Breast cancer scan (mammogram): If you've ever had breasts, begin having regular mammograms starting at age 40. This is a scan to check for breast cancer.  Colon cancer screening: It is important to start screening for colon cancer at age 45.  Have a colonoscopy test every 10 years (or more often if you're at risk) Or, ask your provider about stool tests like a FIT test every year or Cologuard test every 3 years.  To learn more about your testing options, visit: www.[employer]/075909.pdf.  For help making a decision, visit: marcelo/lo96686.  Prostate cancer screening test: If you have a prostate and are age 55  to 69, ask your provider if you would benefit from a yearly prostate cancer screening test.  Lung cancer screening: If you are a current or former smoker age 50 to 80, ask your care team if ongoing lung cancer screenings are right for you.  For informational purposes only. Not to replace the advice of your health care provider. Copyright   2023 [employer]. All rights reserved. Clinically reviewed by the [facility] Transitions Program. [employer] [id number] - REV 04/24.    Learning About Stress  What is stress?     Stress is your body's response to a hard situation. Your body can have a physical, emotional, or mental response. Stress is a fact of life for most people, and it affects everyone differently. What causes stress for you may not be stressful for someone else.  A lot of things can cause stress. You may feel stress when you go on a job interview, take a test, or run a race. This kind of short-term stress is normal and even useful. It can help you if you need to work hard or react quickly. For example, stress can help you finish an important job on time.  Long-term stress is caused by ongoing stressful situations or events. Examples of long-term stress include long-term health problems, ongoing problems at work, or conflicts in your family. Long-term stress can harm your health.  How does stress affect your health?  When you are stressed, your body responds as though you are in danger. It makes hormones that speed up your heart, make you breathe faster, and give you a burst of energy. This is called the fight-or-flight stress response. If the stress is over quickly, your body goes back to normal and no harm is done.  But if stress happens too often or lasts too long, it can have bad effects. Long-term stress can make you more likely to get sick, and it can make symptoms of some diseases worse. If you tense up when you are stressed, you may develop neck, shoulder, or low back pain. Stress is  linked to high blood pressure and heart disease.  Stress also harms your emotional health. It can make you soares, tense, or depressed. Your relationships may suffer, and you may not do well at work or school.  What can you do to manage stress?  You can try these things to help manage stress:   Do something active. Exercise or activity can help reduce stress. Walking is a great way to get started. Even everyday activities such as housecleaning or yard work can help.  Try yoga or zayra chi. These techniques combine exercise and meditation. You may need some training at first to learn them.  Do something you enjoy. For example, listen to music or go to a movie. Practice your hobby or do volunteer work.  Meditate. This can help you relax, because you are not worrying about what happened before or what may happen in the future.  Do guided imagery. Imagine yourself in any setting that helps you feel calm. You can use online videos, books, or a teacher to guide you.  Do breathing exercises. For example:  From a standing position, bend forward from the waist with your knees slightly bent. Let your arms dangle close to the floor.  Breathe in slowly and deeply as you return to a standing position. Roll up slowly and lift your head last.  Hold your breath for just a few seconds in the standing position.  Breathe out slowly and bend forward from the waist.  Let your feelings out. Talk, laugh, cry, and express anger when you need to. Talking with supportive friends or family, a counselor, or a terrence leader about your feelings is a healthy way to relieve stress. Avoid discussing your feelings with people who make you feel worse.  Write. It may help to write about things that are bothering you. This helps you find out how much stress you feel and what is causing it. When you know this, you can find better ways to cope.  What can you do to prevent stress?  You might try some of these things to help prevent stress:  Manage your time.  "This helps you find time to do the things you want and need to do.  Get enough sleep. Your body recovers from the stresses of the day while you are sleeping.  Get support. Your family, friends, and community can make a difference in how you experience stress.  Limit your news feed. Avoid or limit time on social media or news that may make you feel stressed.  Do something active. Exercise or activity can help reduce stress. Walking is a great way to get started.  Where can you learn more?  Go to https://www.[employer].net/patiented  Enter N032 in the search box to learn more about \"Learning About Stress.\"  Current as of: October 24, 2023               Content Version: 14.0    5581-7924 [employer].   Care instructions adapted under license by your healthcare professional. If you have questions about a medical condition or this instruction, always ask your healthcare professional. [employer] disclaims any warranty or liability for your use of this information.      Learning About Sleeping Well  What does sleeping well mean?     Sleeping well means getting enough sleep to feel good and stay healthy. How much sleep is enough varies among people.  The number of hours you sleep and how you feel when you wake up are both important. If you do not feel refreshed, you probably need more sleep. Another sign of not getting enough sleep is feeling tired during the day.  Experts recommend that adults get at least 7 or more hours of sleep per day. Children and older adults need more sleep.  Why is getting enough sleep important?  Getting enough quality sleep is a basic part of good health. When your sleep suffers, your physical health, mood, and your thoughts can suffer too. You may find yourself feeling more grumpy or stressed. Not getting enough sleep also can lead to serious problems, including injury, accidents, anxiety, and depression.  What might cause poor sleeping?  Many things can cause sleep " "problems, including:  Changes to your sleep schedule.  Stress. Stress can be caused by fear about a single event, such as giving a speech. Or you may have ongoing stress, such as worry about work or school.  Depression, anxiety, and other mental or emotional conditions.  Changes in your sleep habits or surroundings. This includes changes that happen where you sleep, such as noise, light, or sleeping in a different bed. It also includes changes in your sleep pattern, such as having jet lag or working a late shift.  Health problems, such as pain, breathing problems, and restless legs syndrome.  Lack of regular exercise.  Using alcohol, nicotine, or caffeine before bed.  How can you help yourself?  Here are some tips that may help you sleep more soundly and wake up feeling more refreshed.  Your sleeping area   Use your bedroom only for sleeping and sex. A bit of light reading may help you fall asleep. But if it doesn't, do your reading elsewhere in the house. Try not to use your TV, computer, smartphone, or tablet while you are in bed.  Be sure your bed is big enough to stretch out comfortably, especially if you have a sleep partner.  Keep your bedroom quiet, dark, and cool. Use curtains, blinds, or a sleep mask to block out light. To block out noise, use earplugs, soothing music, or a \"white noise\" machine.  Your evening and bedtime routine   Create a relaxing bedtime routine. You might want to take a warm shower or bath, or listen to soothing music.  Go to bed at the same time every night. And get up at the same time every morning, even if you feel tired.  What to avoid   Limit caffeine (coffee, tea, caffeinated sodas) during the day, and don't have any for at least 6 hours before bedtime.  Avoid drinking alcohol before bedtime. Alcohol can cause you to wake up more often during the night.  Try not to smoke or use tobacco, especially in the evening. Nicotine can keep you awake.  Limit naps during the day, especially " "close to bedtime.  Avoid lying in bed awake for too long. If you can't fall asleep or if you wake up in the middle of the night and can't get back to sleep within about 20 minutes, get out of bed and go to another room until you feel sleepy.  Avoid taking medicine right before bed that may keep you awake or make you feel hyper or energized. Your doctor can tell you if your medicine may do this and if you can take it earlier in the day.  If you can't sleep   Imagine yourself in a peaceful, pleasant scene. Focus on the details and feelings of being in a place that is relaxing.  Get up and do a quiet or boring activity until you feel sleepy.  Avoid drinking any liquids before going to bed to help prevent waking up often to use the bathroom.  Where can you learn more?  Go to https://www.[employer].net/patiented  Enter J942 in the search box to learn more about \"Learning About Sleeping Well.\"  Current as of: July 10, 2023               Content Version: 14.0    4344-3177 [employer].   Care instructions adapted under license by your healthcare professional. If you have questions about a medical condition or this instruction, always ask your healthcare professional. [employer] disclaims any warranty or liability for your use of this information.      "

## 2024-05-30 NOTE — PROGRESS NOTES
Preventive Care Visit  [facility]  Milady Awan MD, Family Medicine  May 30, 2024      1. Encounter for Medicare annual wellness exam  Dahlia comes in today for her annual wellness exam.  As far as healthcare maintenance, her next colonoscopy is due in 2029.  She is due for her mammogram and she will set that up.  She had bone density in 2023 and will be due in 2025.  She plans to wait till the fall to get the next COVID booster   - PRIMARY CARE FOLLOW-UP SCHEDULING    2. Primary hypertension  Her blood pressures under good control with her current medication.    3. Hypercholesteremia  Her lipids have been stable with her current statin.  She due for fasting blood work today.  - CBC with platelets; Future  - Comprehensive metabolic panel; Future  - Lipid panel reflex to direct LDL Fasting; Future    4. Lumbar radiculopathy  This is been well-controlled with gabapentin.    5. Age-related osteoporosis without current pathological fracture  She started Fosamax in the fall but she feels like this or an upset stomach.  She stopped taking about a month ago.  We discussed other options and she would like to try the once monthly prescription.  If that is not going well for her, then would probably recommend Prolia.  - IBANdronate (BONIVA) 150 MG tablet; Take 1 tablet (150 mg) by mouth every 30 days  Dispense: 3 tablet; Refill: 3    6. Gastroesophageal reflux disease without esophagitis  Stable    7. Class 1 obesity with serious comorbidity and body mass index (BMI) of 32.0 to 32.9 in adult, unspecified obesity type  Comorbidities include hyperlipidemia and hypertension.  She is trying to stay active.    8. Post-viral cough syndrome  She has had a postviral cough most of this month.  Will have her try an inhaled steroid and bronchodilator for the next month to see if this will help resolve things.  She is getting a little dyspnea on exertion but her exam is otherwise normal.  -  budesonide-formoterol (SYMBICORT) 160-4.5 MCG/ACT Inhaler; Inhale 2 puffs into the lungs 2 times daily  Dispense: 10.2 g; Refill: 0    9. Chronic right shoulder pain  She is having some right shoulder pain after an injury last August.  She may have a small partial tear of either the rotator cuff or the labrum.  Would recommend for starting conservative with some physical therapy.  She will let me know if that is not helpful, then would probably do some imaging at that point.  - Physical Therapy  Referral; Future      Subjective   Dahlia is a 68 year old, presenting for the following:  Physical (Fasting. Lingering fatigue with URI. Right shoulder injury in August. )        5/30/2024     8:38 AM   Additional Questions   Roomed by          Health Care Directive  Patient does not have a Health Care Directive or Living Will: Discussed advance care planning with patient; information given to patient to review.    RASHMI Villagomez comes in today for her annual exam.  She has a couple concerns.  Right shoulder pain: She states that she fell after getting a bodyaches in August of last year.  She was holding onto something when she kind of fell and she is unsure really the mechanism.  She states it did not hurt that much initially but is now been more of a chronic problem.  It tends to be certain movements.  If she is driving in the car and turning the wheel that will bother her.  She has not really noticed a lot of decreased range of motion.  It just does not seem to be getting better.  It does not keep her up at night.  URI symptoms.  She states this started at the beginning of the May.  She had a fever and a cough.  She states overall the cough is improving and she is feeling better but she still has a lot of fatigue and she feels a little bit short of breath with activity.  She does have seasonal allergies and she is wondering if that is contributing.  She is taking her Claritin.  Fosamax: She just darted this in the  fall for osteoporosis.  She feels like it upsets her stomach and she is wondering about alternatives.  She really does not want to do an injection at this point so would like to try the once monthly version.            5/27/2024   General Health   How would you rate your overall physical health? Good   Feel stress (tense, anxious, or unable to sleep) Only a little   (!) STRESS CONCERN      5/27/2024   Nutrition   Diet: Regular (no restrictions)         5/27/2024   Exercise   Days per week of moderate/strenous exercise 3 days   Average minutes spent exercising at this level 30 min         5/27/2024   Social Factors   Frequency of gathering with friends or relatives Twice a week   Worry food won't last until get money to buy more No   Food not last or not have enough money for food? No   Do you have housing?  Yes   Are you worried about losing your housing? No   Lack of transportation? No   Unable to get utilities (heat,electricity)? No         5/27/2024   Fall Risk   Fallen 2 or more times in the past year? No    No   Trouble with walking or balance? No    No          5/27/2024   Activities of Daily Living- Home Safety   Needs help with the following daily activites None of the above   Safety concerns in the home None of the above         5/27/2024   Dental   Dentist two times every year? Yes         5/27/2024   Hearing Screening   Hearing concerns? None of the above         5/27/2024   Driving Risk Screening   Patient/family members have concerns about driving No         5/27/2024   General Alertness/Fatigue Screening   Have you been more tired than usual lately? (!) YES         5/27/2024   Urinary Incontinence Screening   Bothered by leaking urine in past 6 months No            Today's PHQ-2 Score:       5/30/2024     8:36 AM   PHQ-2 ( 1999 Pf[employer])   Q1: Little interest or pleasure in doing things 0   Q2: Feeling down, depressed or hopeless 0   PHQ-2 Score 0   Q1: Little interest or pleasure in doing things Not at  all   Q2: Feeling down, depressed or hopeless Not at all   PHQ-2 Score 0           5/27/2024   Substance Use   Alcohol more than 3/day or more than 7/wk No   Do you have a current opioid prescription? No   How severe/bad is pain from 1 to 10? 0/10 (No Pain)   Do you use any other substances recreationally? No     Social History     Tobacco Use    Smoking status: Never    Smokeless tobacco: Never   Vaping Use    Vaping status: Never Used   Substance Use Topics    Alcohol use: No           4/27/2023   LAST FHS-7 RESULTS   1st degree relative breast or ovarian cancer No   Any relative bilateral breast cancer No   Any male have breast cancer No   Any ONE woman have BOTH breast AND ovarian cancer No   Any woman with breast cancer before 50yrs No   2 or more relatives with breast AND/OR ovarian cancer No   2 or more relatives with breast AND/OR bowel cancer No        Mammogram Screening - Mammogram every 1-2 years updated in Health Maintenance based on mutual decision making      History of abnormal Pap smear: No - age 65 or older with adequate negative prior screening test results (3 consecutive negative cytology results, 2 consecutive negative cotesting results, or 2 consecutive negative HrHPV test results within 10 years, with the most recent test occurring within the recommended screening interval for the test used)        Latest Ref Rng & Units 4/15/2019     7:45 AM   PAP / HPV   PAP Negative for squamous intraepithelial lesion or malignancy. Negative for squamous intraepithelial lesion or malignancy  Electronically signed by Raghav England CT (ASCP) on 4/23/2019 at  9:00 AM      HPV 16 DNA NEG Negative    HPV 18 DNA NEG Negative    Other HR HPV NEG Negative      ASCVD Risk   The 10-year ASCVD risk score (Yelena CONTRERAS, et al., 2019) is: 12.5%    Values used to calculate the score:      Age: 68 years      Sex: Female      Is Non- : No      Diabetic: No      Tobacco smoker: No       Systolic Blood Pressure: 132 mmHg      Is BP treated: Yes      HDL Cholesterol: 33 mg/dL      Total Cholesterol: 159 mg/dL            Reviewed and updated as needed this visit by Provider                    Lab work is in process  Current Outpatient Medications   Medication Sig Dispense Refill    alendronate (FOSAMAX) 70 MG tablet Take 1 tablet (70 mg) by mouth every 7 days 12 tablet 3    atorvastatin (LIPITOR) 10 MG tablet TAKE ONE TABLET BY MOUTH EVERY NIGHT AT BEDTIME 90 tablet 3    budesonide-formoterol (SYMBICORT) 160-4.5 MCG/ACT Inhaler Inhale 2 puffs into the lungs 2 times daily 10.2 g 0    gabapentin (NEURONTIN) 600 MG tablet TAKE ONE HALF TO ONE TABLET (300-600 MG) BY MOUTH EVERY NIGHT AT BEDTIME AS NEEDED 90 tablet 3    IBANdronate (BONIVA) 150 MG tablet Take 1 tablet (150 mg) by mouth every 30 days 3 tablet 3    lisinopril-hydrochlorothiazide (ZESTORETIC) 20-12.5 MG tablet TAKE ONE TABLET BY MOUTH ONCE DAILY 90 tablet 1    vitamin D2 (ERGOCALCIFEROL) 10974 units (1250 mcg) capsule TAKE 1 CAPSULE (50,000 UNITS) BY MOUTH ONCE A WEEK 12 capsule 3     Current providers sharing in care for this patient include:  Patient Care Team:  Milady Awan MD as PCP - General (Family Medicine)  Milady Awan MD as Assigned PCP    The following health maintenance items are reviewed in Epic and correct as of today:  Health Maintenance   Topic Date Due    RSV VACCINE (Pregnancy & 60+) (1 - 1-dose 60+ series) Never done    ANNUAL REVIEW OF HM ORDERS  03/17/2023    COVID-19 Vaccine (6 - 2023-24 season) 02/23/2024    LIPID  04/19/2024    MAMMO SCREENING  04/27/2025    MEDICARE ANNUAL WELLNESS VISIT  05/30/2025    FALL RISK ASSESSMENT  05/30/2025    GLUCOSE  04/19/2026    COLORECTAL CANCER SCREENING  05/14/2029    ADVANCE CARE PLANNING  05/30/2029    DTAP/TDAP/TD IMMUNIZATION (3 - Td or Tdap) 03/17/2032    DEXA  05/18/2038    HEPATITIS C SCREENING  Completed    PHQ-2 (once per calendar year)  Completed    INFLUENZA VACCINE   "Completed    Pneumococcal Vaccine: 65+ Years  Completed    ZOSTER IMMUNIZATION  Completed    IPV IMMUNIZATION  Aged Out    HPV IMMUNIZATION  Aged Out    MENINGITIS IMMUNIZATION  Aged Out    RSV MONOCLONAL ANTIBODY  Aged Out         Review of Systems  Constitutional, neuro, ENT, endocrine, pulmonary, cardiac, gastrointestinal, genitourinary, musculoskeletal, integument and psychiatric systems are negative, except as otherwise noted.     Objective    Exam  /62   Pulse 61   Temp 98  F (36.7  C)   Resp 16   Ht 1.505 m (4' 11.25\")   Wt 74.3 kg (163 lb 12.8 oz)   LMP  (LMP Unknown)   SpO2 98%   BMI 32.80 kg/m     Estimated body mass index is 32.8 kg/m  as calculated from the following:    Height as of this encounter: 1.505 m (4' 11.25\").    Weight as of this encounter: 74.3 kg (163 lb 12.8 oz).    Physical Exam  GENERAL: alert and no distress  EYES: Eyes grossly normal to inspection, PERRL and conjunctivae and sclerae normal  HENT: ear canals and TM's normal, nose and mouth without ulcers or lesions  NECK: no adenopathy, no asymmetry, masses, or scars  RESP: lungs clear to auscultation - no rales, rhonchi or wheezes  BREAST: normal without masses, tenderness or nipple discharge and no palpable axillary masses or adenopathy  CV: regular rate and rhythm, normal S1 S2, no S3 or S4, no murmur, click or rub, no peripheral edema  ABDOMEN: soft, nontender, no hepatosplenomegaly, no masses and bowel sounds normal  MS: right shoulder has no tenderness to palpation.  She has good range of motion with forward flexion, abduction is slightly decreased and internal rotation is normal.  Negative drop test.  SKIN: no suspicious lesions or rashes  NEURO: Normal strength and tone, mentation intact and speech normal  PSYCH: mentation appears normal, affect normal/bright         5/30/2024   Mini Cog   Clock Draw Score 2 Normal   3 Item Recall 3 objects recalled   Mini Cog Total Score 5             5/3/2023   Vision Screen "   Vision Screen Results Pass       Signed Electronically by: Milady Awan MD

## 2024-05-31 LAB
ALBUMIN SERPL BCG-MCNC: 4.5 G/DL (ref 3.5–5.2)
ALP SERPL-CCNC: 67 U/L (ref 40–150)
ALT SERPL W P-5'-P-CCNC: 18 U/L (ref 0–50)
ANION GAP SERPL CALCULATED.3IONS-SCNC: 10 MMOL/L (ref 7–15)
AST SERPL W P-5'-P-CCNC: 27 U/L (ref 0–45)
BILIRUB SERPL-MCNC: 0.5 MG/DL
BUN SERPL-MCNC: 10.2 MG/DL (ref 8–23)
CALCIUM SERPL-MCNC: 9.5 MG/DL (ref 8.8–10.2)
CHLORIDE SERPL-SCNC: 104 MMOL/L (ref 98–107)
CHOLEST SERPL-MCNC: 203 MG/DL
CREAT SERPL-MCNC: 0.75 MG/DL (ref 0.51–0.95)
DEPRECATED HCO3 PLAS-SCNC: 26 MMOL/L (ref 22–29)
EGFRCR SERPLBLD CKD-EPI 2021: 86 ML/MIN/1.73M2
FASTING STATUS PATIENT QL REPORTED: YES
FASTING STATUS PATIENT QL REPORTED: YES
GLUCOSE SERPL-MCNC: 95 MG/DL (ref 70–99)
HDLC SERPL-MCNC: 43 MG/DL
LDLC SERPL CALC-MCNC: 127 MG/DL
NONHDLC SERPL-MCNC: 160 MG/DL
POTASSIUM SERPL-SCNC: 3.8 MMOL/L (ref 3.4–5.3)
PROT SERPL-MCNC: 7 G/DL (ref 6.4–8.3)
SODIUM SERPL-SCNC: 140 MMOL/L (ref 135–145)
TRIGL SERPL-MCNC: 167 MG/DL

## 2024-06-01 ENCOUNTER — MYC MEDICAL ADVICE (OUTPATIENT)
Dept: FAMILY MEDICINE | Facility: CLINIC | Age: 69
End: 2024-06-01
Payer: COMMERCIAL

## 2024-06-01 DIAGNOSIS — E78.00 HYPERCHOLESTEREMIA: Primary | ICD-10-CM

## 2024-06-03 RX ORDER — ATORVASTATIN CALCIUM 20 MG/1
20 TABLET, FILM COATED ORAL DAILY
Qty: 90 TABLET | Refills: 3 | Status: SHIPPED | OUTPATIENT
Start: 2024-06-03

## 2024-06-05 ENCOUNTER — ANCILLARY PROCEDURE (OUTPATIENT)
Dept: MAMMOGRAPHY | Facility: CLINIC | Age: 69
End: 2024-06-05
Attending: FAMILY MEDICINE
Payer: COMMERCIAL

## 2024-06-05 DIAGNOSIS — Z12.31 VISIT FOR SCREENING MAMMOGRAM: ICD-10-CM

## 2024-06-05 PROCEDURE — 77063 BREAST TOMOSYNTHESIS BI: CPT

## 2024-06-05 ASSESSMENT — ACTIVITIES OF DAILY LIVING (ADL)
PUTTING_ON_YOUR_PANTS: 0
PUTTING_ON_A_SHIRT_THAT_BUTTONS_DOWN_THE_FRONT: 0
PUSHING_WITH_THE_INVOLVED_ARM: 2
PLEASE_INDICATE_YOR_PRIMARY_REASON_FOR_REFERRAL_TO_THERAPY:: SHOULDER
REMOVING_SOMETHING_FROM_YOUR_BACK_POCKET: 1
PUTTING_ON_AN_UNDERSHIRT_OR_A_PULLOVER_SWEATER: 0
WASHING_YOUR_HAIR?: 0
REACHING_FOR_SOMETHING_ON_A_HIGH_SHELF: 1
PLACING_AN_OBJECT_ON_A_HIGH_SHELF: 2
WASHING_YOUR_BACK: 0
CARRYING_A_HEAVY_OBJECT_OF_10_POUNDS: 4
TOUCHING_THE_BACK_OF_YOUR_NECK: 1
AT_ITS_WORST?: 5
WHEN_LYING_ON_THE_INVOLVED_SIDE: 0

## 2024-06-10 ENCOUNTER — THERAPY VISIT (OUTPATIENT)
Dept: PHYSICAL THERAPY | Facility: CLINIC | Age: 69
End: 2024-06-10
Attending: FAMILY MEDICINE
Payer: COMMERCIAL

## 2024-06-10 DIAGNOSIS — M25.511 CHRONIC RIGHT SHOULDER PAIN: ICD-10-CM

## 2024-06-10 DIAGNOSIS — G89.29 CHRONIC RIGHT SHOULDER PAIN: ICD-10-CM

## 2024-06-10 PROCEDURE — 97140 MANUAL THERAPY 1/> REGIONS: CPT | Mod: GP | Performed by: PHYSICAL THERAPIST

## 2024-06-10 PROCEDURE — 97110 THERAPEUTIC EXERCISES: CPT | Mod: GP | Performed by: PHYSICAL THERAPIST

## 2024-06-10 PROCEDURE — 97161 PT EVAL LOW COMPLEX 20 MIN: CPT | Mod: GP | Performed by: PHYSICAL THERAPIST
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
## 2024-06-12 ENCOUNTER — ANCILLARY PROCEDURE (OUTPATIENT)
Dept: MAMMOGRAPHY | Facility: CLINIC | Age: 69
End: 2024-06-12
Attending: FAMILY MEDICINE
Payer: COMMERCIAL

## 2024-06-12 DIAGNOSIS — N64.89 BREAST ASYMMETRY: ICD-10-CM

## 2024-06-12 PROCEDURE — 76642 ULTRASOUND BREAST LIMITED: CPT | Mod: RT

## 2024-06-12 PROCEDURE — 77065 DX MAMMO INCL CAD UNI: CPT | Mod: RT

## 2024-06-18 ENCOUNTER — THERAPY VISIT (OUTPATIENT)
Dept: PHYSICAL THERAPY | Facility: CLINIC | Age: 69
End: 2024-06-18
Attending: FAMILY MEDICINE
Payer: COMMERCIAL

## 2024-06-18 DIAGNOSIS — G89.29 CHRONIC RIGHT SHOULDER PAIN: Primary | ICD-10-CM

## 2024-06-18 DIAGNOSIS — M25.511 CHRONIC RIGHT SHOULDER PAIN: Primary | ICD-10-CM

## 2024-06-18 PROCEDURE — 97140 MANUAL THERAPY 1/> REGIONS: CPT | Mod: GP | Performed by: PHYSICAL THERAPIST

## 2024-06-18 PROCEDURE — 97110 THERAPEUTIC EXERCISES: CPT | Mod: GP | Performed by: PHYSICAL THERAPIST

## 2024-06-27 ENCOUNTER — THERAPY VISIT (OUTPATIENT)
Dept: PHYSICAL THERAPY | Facility: CLINIC | Age: 69
End: 2024-06-27
Attending: FAMILY MEDICINE
Payer: COMMERCIAL

## 2024-06-27 DIAGNOSIS — G89.29 CHRONIC RIGHT SHOULDER PAIN: Primary | ICD-10-CM

## 2024-06-27 DIAGNOSIS — M25.511 CHRONIC RIGHT SHOULDER PAIN: Primary | ICD-10-CM

## 2024-06-27 PROCEDURE — 97110 THERAPEUTIC EXERCISES: CPT | Mod: GP | Performed by: PHYSICAL THERAPIST

## 2024-06-27 PROCEDURE — 97140 MANUAL THERAPY 1/> REGIONS: CPT | Mod: GP | Performed by: PHYSICAL THERAPIST

## 2024-07-11 ENCOUNTER — THERAPY VISIT (OUTPATIENT)
Dept: PHYSICAL THERAPY | Facility: CLINIC | Age: 69
End: 2024-07-11
Attending: FAMILY MEDICINE
Payer: COMMERCIAL

## 2024-07-11 DIAGNOSIS — G89.29 CHRONIC RIGHT SHOULDER PAIN: Primary | ICD-10-CM

## 2024-07-11 DIAGNOSIS — M25.511 CHRONIC RIGHT SHOULDER PAIN: Primary | ICD-10-CM

## 2024-07-11 PROCEDURE — 97140 MANUAL THERAPY 1/> REGIONS: CPT | Mod: GP | Performed by: PHYSICAL THERAPIST

## 2024-07-11 PROCEDURE — 97110 THERAPEUTIC EXERCISES: CPT | Mod: GP | Performed by: PHYSICAL THERAPIST

## 2024-07-24 ENCOUNTER — THERAPY VISIT (OUTPATIENT)
Dept: PHYSICAL THERAPY | Facility: CLINIC | Age: 69
End: 2024-07-24
Attending: FAMILY MEDICINE
Payer: COMMERCIAL

## 2024-07-24 DIAGNOSIS — M25.511 CHRONIC RIGHT SHOULDER PAIN: Primary | ICD-10-CM

## 2024-07-24 DIAGNOSIS — G89.29 CHRONIC RIGHT SHOULDER PAIN: Primary | ICD-10-CM

## 2024-07-24 PROCEDURE — 97110 THERAPEUTIC EXERCISES: CPT | Mod: GP | Performed by: PHYSICAL THERAPIST

## 2024-07-24 NOTE — PROGRESS NOTES
"    DISCHARGE  Reason for Discharge: Patient has met all goals.    Equipment Issued: none    Discharge Plan: Patient to continue home program.    Referring Provider:  Milady Awan     07/24/24 0500   Appointment Info   Signing clinician's name / credentials Brook Sainz, PT, DPT   Total/Authorized Visits 8 planned   Visits Used 5   Medical Diagnosis Chronic right shoulder pain (M25.511, G89.29)   PT Tx Diagnosis R shoulder pain   Quick Adds Certification   Progress Note/Certification   Start of Care Date 06/10/24   Onset of illness/injury or Date of Surgery 05/30/24  (order date)   Therapy Frequency 1x/wk   Predicted Duration 8 weeks   Certification date from 06/10/24   Certification date to 08/05/24   Progress Note Due Date 08/05/24   Progress Note Completed Date 06/10/24   GOALS   PT Goals 2;3   PT Goal 1   Goal Identifier STG   Goal Description Pt will have no pain with IR resistance and 5/5 strength to reduce pain to the area in 4 weeks.   Target Date 07/08/24   Date Met 07/11/24   PT Goal 2   Goal Identifier LTG   Goal Description Pt will have 5/5 shoulder strength to stabilize the area in 8 weeks.   Target Date 08/05/24   Date Met 07/24/24   PT Goal 3   Goal Identifier LTG   Goal Description Pt will be independent in home strengthening program for management of Sx in 8 weeks.   Target Date 08/05/24   Date Met 07/24/24   Subjective Report   Subjective Report supra. Pt notes when quilting having \"twinges\" of pain at this time. No other pain to the area at this time.   Objective Measures   Objective Measures Objective Measure 1;Objective Measure 2   Objective Measure 1   Objective Measure ROM   Details flex- 145*;  abd- 150*   Objective Measure 2   Objective Measure MMT   Details shoulder flex- 5/5, abd- 5/5, ER- 5/5, IR- 5/5   Treatment Interventions (PT)   Interventions Therapeutic Procedure/Exercise;Manual Therapy   Therapeutic Procedure/Exercise   Therapeutic Procedures: strength, endurance, ROM, " flexibility minutes (15969) 25   Ther Proc 1 - Details SL ER with 1#- 30x;  wa;ll washing- abd- 10x;  wall slides- 20x;  active IR wtih BTB- 20x;  shoulder ext wtih BTB- 20x;  lat pulldown with BT- 20x;  active abd- 20x   Skilled Intervention used to decrease tension to the area, allow full mobility   Patient Response/Progress stiffenss at the start of shoulder abd   Education   Learner/Method Patient;Listening;Demonstration;No Barriers to Learning   Plan   Home program papers- scap ret, SL ER, active IR, wall washing, standing shoulder abd   Plan for next session discharge   Total Session Time   Timed Code Treatment Minutes 25   Total Treatment Time (sum of timed and untimed services) 25

## 2024-09-20 DIAGNOSIS — M54.16 LUMBAR RADICULOPATHY: Chronic | ICD-10-CM

## 2024-09-23 RX ORDER — GABAPENTIN 600 MG/1
TABLET ORAL
Qty: 90 TABLET | Refills: 0 | Status: SHIPPED | OUTPATIENT
Start: 2024-09-23

## 2024-10-22 DIAGNOSIS — I10 PRIMARY HYPERTENSION: ICD-10-CM

## 2024-10-22 RX ORDER — LISINOPRIL AND HYDROCHLOROTHIAZIDE 12.5; 2 MG/1; MG/1
1 TABLET ORAL DAILY
Qty: 90 TABLET | Refills: 1 | Status: SHIPPED | OUTPATIENT
Start: 2024-10-22

## 2024-11-15 ENCOUNTER — IMMUNIZATION (OUTPATIENT)
Dept: FAMILY MEDICINE | Facility: CLINIC | Age: 69
End: 2024-11-15
Payer: COMMERCIAL

## 2025-01-27 NOTE — TELEPHONE ENCOUNTER
Incoming call from You with [facility]. He did not state what it's in regards to. He would like to talk to Dr. Awan or her nurse. Please call You at [phone].   Negative

## 2025-02-19 DIAGNOSIS — E55.9 VITAMIN D DEFICIENCY: ICD-10-CM

## 2025-02-19 RX ORDER — ERGOCALCIFEROL 1.25 MG/1
50000 CAPSULE, LIQUID FILLED ORAL WEEKLY
Qty: 12 CAPSULE | Refills: 3 | Status: SHIPPED | OUTPATIENT
Start: 2025-02-19

## 2025-02-20 ENCOUNTER — OFFICE VISIT (OUTPATIENT)
Dept: FAMILY MEDICINE | Facility: CLINIC | Age: 70
End: 2025-02-20
Payer: COMMERCIAL

## 2025-02-20 VITALS
HEART RATE: 74 BPM | SYSTOLIC BLOOD PRESSURE: 130 MMHG | TEMPERATURE: 98.2 F | BODY MASS INDEX: 32.66 KG/M2 | HEIGHT: 59 IN | OXYGEN SATURATION: 97 % | DIASTOLIC BLOOD PRESSURE: 59 MMHG | WEIGHT: 162 LBS | RESPIRATION RATE: 16 BRPM

## 2025-02-20 DIAGNOSIS — M79.652 PAIN OF LEFT THIGH: ICD-10-CM

## 2025-02-20 DIAGNOSIS — M25.50 MULTIPLE JOINT PAIN: Primary | ICD-10-CM

## 2025-02-20 DIAGNOSIS — G89.29 CHRONIC RIGHT SHOULDER PAIN: ICD-10-CM

## 2025-02-20 DIAGNOSIS — M81.0 AGE-RELATED OSTEOPOROSIS WITHOUT CURRENT PATHOLOGICAL FRACTURE: ICD-10-CM

## 2025-02-20 DIAGNOSIS — M25.511 CHRONIC RIGHT SHOULDER PAIN: ICD-10-CM

## 2025-02-20 DIAGNOSIS — M79.642 PAIN IN BOTH HANDS: ICD-10-CM

## 2025-02-20 DIAGNOSIS — E78.00 HYPERCHOLESTEREMIA: ICD-10-CM

## 2025-02-20 DIAGNOSIS — M79.641 PAIN IN BOTH HANDS: ICD-10-CM

## 2025-02-20 LAB
CRP SERPL-MCNC: 61 MG/L
ERYTHROCYTE [SEDIMENTATION RATE] IN BLOOD BY WESTERGREN METHOD: 29 MM/HR (ref 0–30)
RHEUMATOID FACT SERPL-ACNC: <10 IU/ML
URATE SERPL-MCNC: 5.5 MG/DL (ref 2.4–5.7)

## 2025-02-20 NOTE — PROGRESS NOTES
Assessment/ Plan     1. Multiple joint pain (Primary)  Dahlia presents with joint pain and some muscle aches.  I suspect this is likely osteoarthritis aggravated by recent injury along with a muscle strain.  However, as she does have diffuse pain so would recommend just doing an inflammatory workup.  I would like her to see the orthopedist for her right shoulder as she has failed physical therapy and has decreased range of motion and some weakness.  - Anti Nuclear Leatha IgG by IFA with Reflex; Future  - CRP inflammation; Future  - Cyclic Citrullinated Peptide Antibody IgG; Future  - Erythrocyte sedimentation rate auto; Future  - Rheumatoid factor; Future  - Uric acid; Future  - LYME DISEASE TOTAL ANTIBODIES WITH REFLEX TO CONFIRMATION; Future  - Anti Nuclear Leatha IgG by IFA with Reflex  - CRP inflammation  - Cyclic Citrullinated Peptide Antibody IgG  - Erythrocyte sedimentation rate auto  - Rheumatoid factor  - Uric acid  - LYME DISEASE TOTAL ANTIBODIES WITH REFLEX TO CONFIRMATION    2. Chronic right shoulder pain  This has been an ongoing issue and she did physical therapy through the summer but does have significant decreased range of motion and some weakness.  - Orthopedic  Referral; Future    3. Pain in both hands  This is osteoarthritis.  Will have her try some diclofenac gel over-the-counter.  Lab workup as above.    4. Pain of left thigh  This has been for about 8 weeks since she had a fall.  I suspect this is a muscle strain and it is slowly improving.    5. Age-related osteoporosis without current pathological fracture  This has been stable.  No concerns for fracture today.    6. Hypercholesteremia  We did increase her dose of statin but that was in June.  She did not start to get issues until she had a fall about 8 weeks ago.      Subjective:      Dahlia Torres is a 69 year old female who presents for multiple joint pains.  She states that things really started to bother her after she had a fall in  December.  She was walking up a deck and it was icy and she can fell forward and landed on her hands and knees.  She feels like since then everything has been aggravated.  I saw her for her annual exam over the summer and she was having quite a bit of right shoulder discomfort.  I had her do physical therapy which she felt was helpful at the time but now she is having worsening issues with that shoulder such decreased range of motion.  She describes the pain is mostly over her biceps bilaterally.  She feels like her arms are weak.  It is hard to lift things over her head or reach for things.  She does have pain in her hands that she suspects is osteoarthritis.  She has been avoiding doing hobbies such as sewing because it has been really uncomfortable.  Sometimes she will have some swelling, but no redness or warmth.  She has had a right knee replacement and knows she has osteoarthritis in the left knee as they talked about doing replacement at some point.  Most of her pain is now can of across the thigh on the left and she does feel like it has slowly improved over the last 8 weeks.  It does help to take some anti-inflammatories but after couple of days she starts to get an upset stomach.    The longitudinal plan of care for the diagnosis(es)/condition(s) as documented were addressed during this visit. Due to the added complexity in care, I will continue to support Dahlia in the subsequent management and with ongoing continuity of care.         Relevant past medical, family, surgical, and social history reviewed with patient, unless noted in HPI, not pertinent for this visit.  Medications were discussed and reconciled.   Review of Systems   A 12 point comprehensive review of systems was negative except as noted.      Current Outpatient Medications   Medication Sig Dispense Refill    atorvastatin (LIPITOR) 20 MG tablet Take 1 tablet (20 mg) by mouth daily 90 tablet 3    budesonide-formoterol (SYMBICORT) 160-4.5  "MCG/ACT Inhaler Inhale 2 puffs into the lungs 2 times daily 10.2 g 0    gabapentin (NEURONTIN) 600 MG tablet TAKE ONE HALF TO ONE TABLET (300-600 MG) BY MOUTH EVERY NIGHT AT BEDTIME AS NEEDED 90 tablet 0    IBANdronate (BONIVA) 150 MG tablet Take 1 tablet (150 mg) by mouth every 30 days 3 tablet 3    lisinopril-hydrochlorothiazide (ZESTORETIC) 20-12.5 MG tablet TAKE ONE TABLET BY MOUTH ONCE DAILY 90 tablet 1    vitamin D2 (ERGOCALCIFEROL) 54244 units (1250 mcg) capsule TAKE 1 CAPSULE (50,000 UNITS) BY MOUTH ONCE A WEEK 12 capsule 3         Objective:     /59   Pulse 74   Temp 98.2  F (36.8  C)   Resp 16   Ht 1.505 m (4' 11.25\")   Wt 73.5 kg (162 lb)   LMP  (LMP Unknown)   SpO2 97%   BMI 32.44 kg/m      Body mass index is 32.44 kg/m .       General appearance: alert, appears stated age and cooperative  Bilateral hands: She does have some arthritic changes in the DIPs bilaterally.  Mild tenderness to palpation but no redness or warmth.  Shoulders: The right shoulder has marked decreased range of motion with forward flexion and abduction to about 90 degrees.  The left is just mildly decreased.  She does have some weakness especially on the right with drop test.  She is tender over the biceps bilaterally and this is worse with resisted flexion at the elbow.    No results found for this or any previous visit (from the past week).       This note has been dictated using voice recognition software. Any grammatical or context distortions are unintentional and inherent to the software  "

## 2025-02-25 LAB — ANA SER QL IF: NEGATIVE

## 2025-02-28 ENCOUNTER — ANCILLARY PROCEDURE (OUTPATIENT)
Dept: GENERAL RADIOLOGY | Facility: CLINIC | Age: 70
End: 2025-02-28
Attending: PEDIATRICS
Payer: COMMERCIAL

## 2025-02-28 DIAGNOSIS — M25.511 CHRONIC RIGHT SHOULDER PAIN: ICD-10-CM

## 2025-02-28 DIAGNOSIS — G89.29 CHRONIC RIGHT SHOULDER PAIN: ICD-10-CM

## 2025-02-28 PROCEDURE — 73030 X-RAY EXAM OF SHOULDER: CPT | Mod: TC | Performed by: RADIOLOGY

## 2025-03-07 ENCOUNTER — HOSPITAL ENCOUNTER (OUTPATIENT)
Dept: MRI IMAGING | Facility: CLINIC | Age: 70
Discharge: HOME | End: 2025-03-07
Attending: PEDIATRICS | Admitting: PEDIATRICS
Payer: COMMERCIAL

## 2025-03-07 DIAGNOSIS — G89.29 CHRONIC RIGHT SHOULDER PAIN: ICD-10-CM

## 2025-03-07 DIAGNOSIS — M25.511 CHRONIC RIGHT SHOULDER PAIN: ICD-10-CM

## 2025-03-07 DIAGNOSIS — S49.91XA INJURY OF RIGHT SHOULDER, INITIAL ENCOUNTER: ICD-10-CM

## 2025-03-07 PROCEDURE — 73221 MRI JOINT UPR EXTREM W/O DYE: CPT | Mod: RT

## 2025-03-07 PROCEDURE — 73221 MRI JOINT UPR EXTREM W/O DYE: CPT | Mod: 26 | Performed by: RADIOLOGY

## 2025-03-16 DIAGNOSIS — M54.16 LUMBAR RADICULOPATHY: Chronic | ICD-10-CM

## 2025-03-17 RX ORDER — GABAPENTIN 600 MG/1
TABLET ORAL
Qty: 90 TABLET | Refills: 0 | Status: SHIPPED | OUTPATIENT
Start: 2025-03-17

## 2025-03-19 ASSESSMENT — ACTIVITIES OF DAILY LIVING (ADL)
AT_ITS_WORST?: 8
PLACING_AN_OBJECT_ON_A_HIGH_SHELF: 5
WASHING_YOUR_HAIR?: 0
REMOVING_SOMETHING_FROM_YOUR_BACK_POCKET: 4
PUTTING_ON_AN_UNDERSHIRT_OR_A_PULLOVER_SWEATER: 3
WHEN_LYING_ON_THE_INVOLVED_SIDE: 3
TOUCHING_THE_BACK_OF_YOUR_NECK: 2
PUSHING_WITH_THE_INVOLVED_ARM: 6
PUTTING_ON_A_SHIRT_THAT_BUTTONS_DOWN_THE_FRONT: 0
PLEASE_INDICATE_YOR_PRIMARY_REASON_FOR_REFERRAL_TO_THERAPY:: SHOULDER
CARRYING_A_HEAVY_OBJECT_OF_10_POUNDS: 6
WASHING_YOUR_BACK: 0
REACHING_FOR_SOMETHING_ON_A_HIGH_SHELF: 6

## 2025-03-24 ENCOUNTER — THERAPY VISIT (OUTPATIENT)
Dept: PHYSICAL THERAPY | Facility: CLINIC | Age: 70
End: 2025-03-24
Attending: PEDIATRICS
Payer: COMMERCIAL

## 2025-03-24 DIAGNOSIS — M25.511 CHRONIC RIGHT SHOULDER PAIN: ICD-10-CM

## 2025-03-24 DIAGNOSIS — S49.91XA INJURY OF RIGHT SHOULDER, INITIAL ENCOUNTER: ICD-10-CM

## 2025-03-24 DIAGNOSIS — M75.81 TENDINITIS OF RIGHT ROTATOR CUFF: ICD-10-CM

## 2025-03-24 DIAGNOSIS — M75.111 INCOMPLETE TEAR OF RIGHT ROTATOR CUFF, UNSPECIFIED WHETHER TRAUMATIC: ICD-10-CM

## 2025-03-24 DIAGNOSIS — M19.011 ARTHRITIS OF RIGHT SHOULDER REGION: ICD-10-CM

## 2025-03-24 DIAGNOSIS — G89.29 CHRONIC RIGHT SHOULDER PAIN: ICD-10-CM

## 2025-03-24 PROCEDURE — 97110 THERAPEUTIC EXERCISES: CPT | Mod: GP

## 2025-03-24 PROCEDURE — 97161 PT EVAL LOW COMPLEX 20 MIN: CPT | Mod: GP
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
## 2025-03-27 DIAGNOSIS — E78.00 HYPERCHOLESTEREMIA: ICD-10-CM

## 2025-03-27 RX ORDER — ATORVASTATIN CALCIUM 20 MG/1
20 TABLET, FILM COATED ORAL DAILY
Qty: 90 TABLET | Refills: 3 | OUTPATIENT
Start: 2025-03-27

## 2025-04-03 ENCOUNTER — THERAPY VISIT (OUTPATIENT)
Dept: PHYSICAL THERAPY | Facility: CLINIC | Age: 70
End: 2025-04-03
Attending: PEDIATRICS
Payer: COMMERCIAL

## 2025-04-03 DIAGNOSIS — M75.111 INCOMPLETE TEAR OF RIGHT ROTATOR CUFF, UNSPECIFIED WHETHER TRAUMATIC: ICD-10-CM

## 2025-04-03 DIAGNOSIS — M19.011 ARTHRITIS OF RIGHT SHOULDER REGION: ICD-10-CM

## 2025-04-03 DIAGNOSIS — G89.29 CHRONIC RIGHT SHOULDER PAIN: Primary | ICD-10-CM

## 2025-04-03 DIAGNOSIS — S49.91XA INJURY OF RIGHT SHOULDER, INITIAL ENCOUNTER: ICD-10-CM

## 2025-04-03 DIAGNOSIS — M25.511 CHRONIC RIGHT SHOULDER PAIN: Primary | ICD-10-CM

## 2025-04-03 DIAGNOSIS — M75.81 TENDINITIS OF RIGHT ROTATOR CUFF: ICD-10-CM

## 2025-04-03 PROCEDURE — 97110 THERAPEUTIC EXERCISES: CPT | Mod: GP

## 2025-04-09 ENCOUNTER — THERAPY VISIT (OUTPATIENT)
Dept: PHYSICAL THERAPY | Facility: CLINIC | Age: 70
End: 2025-04-09
Attending: PEDIATRICS
Payer: COMMERCIAL

## 2025-04-09 DIAGNOSIS — M25.511 CHRONIC RIGHT SHOULDER PAIN: Primary | ICD-10-CM

## 2025-04-09 DIAGNOSIS — M19.011 ARTHRITIS OF RIGHT SHOULDER REGION: ICD-10-CM

## 2025-04-09 DIAGNOSIS — G89.29 CHRONIC RIGHT SHOULDER PAIN: Primary | ICD-10-CM

## 2025-04-09 DIAGNOSIS — S49.91XA INJURY OF RIGHT SHOULDER, INITIAL ENCOUNTER: ICD-10-CM

## 2025-04-09 DIAGNOSIS — M75.81 TENDINITIS OF RIGHT ROTATOR CUFF: ICD-10-CM

## 2025-04-09 DIAGNOSIS — M75.111 INCOMPLETE TEAR OF RIGHT ROTATOR CUFF, UNSPECIFIED WHETHER TRAUMATIC: ICD-10-CM

## 2025-04-09 PROCEDURE — 97110 THERAPEUTIC EXERCISES: CPT | Mod: GP

## 2025-04-20 DIAGNOSIS — I10 PRIMARY HYPERTENSION: ICD-10-CM

## 2025-04-21 RX ORDER — LISINOPRIL AND HYDROCHLOROTHIAZIDE 12.5; 2 MG/1; MG/1
1 TABLET ORAL DAILY
Qty: 90 TABLET | Refills: 1 | Status: SHIPPED | OUTPATIENT
Start: 2025-04-21

## 2025-04-23 ENCOUNTER — THERAPY VISIT (OUTPATIENT)
Dept: PHYSICAL THERAPY | Facility: CLINIC | Age: 70
End: 2025-04-23
Attending: PEDIATRICS
Payer: COMMERCIAL

## 2025-04-23 DIAGNOSIS — S49.91XA INJURY OF RIGHT SHOULDER, INITIAL ENCOUNTER: ICD-10-CM

## 2025-04-23 DIAGNOSIS — M25.511 CHRONIC RIGHT SHOULDER PAIN: Primary | ICD-10-CM

## 2025-04-23 DIAGNOSIS — M75.111 INCOMPLETE TEAR OF RIGHT ROTATOR CUFF, UNSPECIFIED WHETHER TRAUMATIC: ICD-10-CM

## 2025-04-23 DIAGNOSIS — M19.011 ARTHRITIS OF RIGHT SHOULDER REGION: ICD-10-CM

## 2025-04-23 DIAGNOSIS — G89.29 CHRONIC RIGHT SHOULDER PAIN: Primary | ICD-10-CM

## 2025-04-23 DIAGNOSIS — M75.81 TENDINITIS OF RIGHT ROTATOR CUFF: ICD-10-CM

## 2025-04-23 PROCEDURE — 97110 THERAPEUTIC EXERCISES: CPT | Mod: GP

## 2025-05-21 ENCOUNTER — PATIENT OUTREACH (OUTPATIENT)
Dept: CARE COORDINATION | Facility: CLINIC | Age: 70
End: 2025-05-21
Payer: COMMERCIAL

## 2025-05-29 DIAGNOSIS — M81.0 AGE-RELATED OSTEOPOROSIS WITHOUT CURRENT PATHOLOGICAL FRACTURE: ICD-10-CM

## 2025-05-29 RX ORDER — IBANDRONATE SODIUM 150 MG/1
150 TABLET, FILM COATED ORAL
Qty: 3 TABLET | Refills: 3 | Status: SHIPPED | OUTPATIENT
Start: 2025-05-29

## 2025-06-04 SDOH — HEALTH STABILITY: PHYSICAL HEALTH: ON AVERAGE, HOW MANY DAYS PER WEEK DO YOU ENGAGE IN MODERATE TO STRENUOUS EXERCISE (LIKE A BRISK WALK)?: 3 DAYS

## 2025-06-04 SDOH — HEALTH STABILITY: PHYSICAL HEALTH: ON AVERAGE, HOW MANY MINUTES DO YOU ENGAGE IN EXERCISE AT THIS LEVEL?: 30 MIN

## 2025-06-04 ASSESSMENT — SOCIAL DETERMINANTS OF HEALTH (SDOH): HOW OFTEN DO YOU GET TOGETHER WITH FRIENDS OR RELATIVES?: ONCE A WEEK

## 2025-06-09 ENCOUNTER — OFFICE VISIT (OUTPATIENT)
Dept: FAMILY MEDICINE | Facility: CLINIC | Age: 70
End: 2025-06-09
Attending: FAMILY MEDICINE
Payer: COMMERCIAL

## 2025-06-09 VITALS
OXYGEN SATURATION: 98 % | TEMPERATURE: 97.8 F | SYSTOLIC BLOOD PRESSURE: 131 MMHG | DIASTOLIC BLOOD PRESSURE: 60 MMHG | BODY MASS INDEX: 32.25 KG/M2 | WEIGHT: 160 LBS | HEART RATE: 58 BPM | HEIGHT: 59 IN | RESPIRATION RATE: 16 BRPM

## 2025-06-09 DIAGNOSIS — M25.511 CHRONIC RIGHT SHOULDER PAIN: ICD-10-CM

## 2025-06-09 DIAGNOSIS — M54.16 LUMBAR RADICULOPATHY: Chronic | ICD-10-CM

## 2025-06-09 DIAGNOSIS — E78.00 HYPERCHOLESTEREMIA: ICD-10-CM

## 2025-06-09 DIAGNOSIS — G89.29 CHRONIC LEFT SHOULDER PAIN: ICD-10-CM

## 2025-06-09 DIAGNOSIS — M81.0 AGE-RELATED OSTEOPOROSIS WITHOUT CURRENT PATHOLOGICAL FRACTURE: ICD-10-CM

## 2025-06-09 DIAGNOSIS — M25.512 CHRONIC LEFT SHOULDER PAIN: ICD-10-CM

## 2025-06-09 DIAGNOSIS — G89.29 CHRONIC RIGHT SHOULDER PAIN: ICD-10-CM

## 2025-06-09 DIAGNOSIS — M75.111 INCOMPLETE TEAR OF RIGHT ROTATOR CUFF, UNSPECIFIED WHETHER TRAUMATIC: ICD-10-CM

## 2025-06-09 DIAGNOSIS — E66.811 CLASS 1 OBESITY WITH SERIOUS COMORBIDITY AND BODY MASS INDEX (BMI) OF 32.0 TO 32.9 IN ADULT, UNSPECIFIED OBESITY TYPE: ICD-10-CM

## 2025-06-09 DIAGNOSIS — Z78.0 ASYMPTOMATIC POSTMENOPAUSAL STATUS: ICD-10-CM

## 2025-06-09 DIAGNOSIS — I10 PRIMARY HYPERTENSION: ICD-10-CM

## 2025-06-09 DIAGNOSIS — Z00.00 ENCOUNTER FOR MEDICARE ANNUAL WELLNESS EXAM: Primary | ICD-10-CM

## 2025-06-09 LAB
ALBUMIN SERPL BCG-MCNC: 4.4 G/DL (ref 3.5–5.2)
ALP SERPL-CCNC: 69 U/L (ref 40–150)
ALT SERPL W P-5'-P-CCNC: 17 U/L (ref 0–50)
ANION GAP SERPL CALCULATED.3IONS-SCNC: 12 MMOL/L (ref 7–15)
AST SERPL W P-5'-P-CCNC: 27 U/L (ref 0–45)
BILIRUB SERPL-MCNC: 0.7 MG/DL
BUN SERPL-MCNC: 13.7 MG/DL (ref 8–23)
CALCIUM SERPL-MCNC: 9.8 MG/DL (ref 8.8–10.4)
CHLORIDE SERPL-SCNC: 102 MMOL/L (ref 98–107)
CHOLEST SERPL-MCNC: 187 MG/DL
CREAT SERPL-MCNC: 0.76 MG/DL (ref 0.51–0.95)
EGFRCR SERPLBLD CKD-EPI 2021: 83 ML/MIN/1.73M2
ERYTHROCYTE [DISTWIDTH] IN BLOOD BY AUTOMATED COUNT: 12.6 % (ref 10–15)
FASTING STATUS PATIENT QL REPORTED: YES
FASTING STATUS PATIENT QL REPORTED: YES
GLUCOSE SERPL-MCNC: 99 MG/DL (ref 70–99)
HCO3 SERPL-SCNC: 26 MMOL/L (ref 22–29)
HCT VFR BLD AUTO: 40.4 % (ref 35–47)
HDLC SERPL-MCNC: 43 MG/DL
HGB BLD-MCNC: 13.4 G/DL (ref 11.7–15.7)
LDLC SERPL CALC-MCNC: 114 MG/DL
MCH RBC QN AUTO: 29.1 PG (ref 26.5–33)
MCHC RBC AUTO-ENTMCNC: 33.2 G/DL (ref 31.5–36.5)
MCV RBC AUTO: 88 FL (ref 78–100)
NONHDLC SERPL-MCNC: 144 MG/DL
PLATELET # BLD AUTO: 292 10E3/UL (ref 150–450)
POTASSIUM SERPL-SCNC: 3.7 MMOL/L (ref 3.4–5.3)
PROT SERPL-MCNC: 6.9 G/DL (ref 6.4–8.3)
RBC # BLD AUTO: 4.61 10E6/UL (ref 3.8–5.2)
SODIUM SERPL-SCNC: 140 MMOL/L (ref 135–145)
TRIGL SERPL-MCNC: 151 MG/DL
WBC # BLD AUTO: 5.6 10E3/UL (ref 4–11)

## 2025-06-09 PROCEDURE — 36415 COLL VENOUS BLD VENIPUNCTURE: CPT | Performed by: FAMILY MEDICINE

## 2025-06-09 PROCEDURE — 3075F SYST BP GE 130 - 139MM HG: CPT | Performed by: FAMILY MEDICINE

## 2025-06-09 PROCEDURE — G0439 PPPS, SUBSEQ VISIT: HCPCS | Performed by: FAMILY MEDICINE

## 2025-06-09 PROCEDURE — 99214 OFFICE O/P EST MOD 30 MIN: CPT | Mod: 25 | Performed by: FAMILY MEDICINE

## 2025-06-09 PROCEDURE — 80053 COMPREHEN METABOLIC PANEL: CPT | Performed by: FAMILY MEDICINE

## 2025-06-09 PROCEDURE — 80061 LIPID PANEL: CPT | Performed by: FAMILY MEDICINE

## 2025-06-09 PROCEDURE — 3078F DIAST BP <80 MM HG: CPT | Performed by: FAMILY MEDICINE

## 2025-06-09 PROCEDURE — G2211 COMPLEX E/M VISIT ADD ON: HCPCS | Performed by: FAMILY MEDICINE

## 2025-06-09 PROCEDURE — 85027 COMPLETE CBC AUTOMATED: CPT | Performed by: FAMILY MEDICINE

## 2025-06-09 RX ORDER — GABAPENTIN 600 MG/1
TABLET ORAL
Qty: 90 TABLET | Refills: 3 | Status: SHIPPED | OUTPATIENT
Start: 2025-06-09

## 2025-06-09 RX ORDER — LISINOPRIL AND HYDROCHLOROTHIAZIDE 12.5; 2 MG/1; MG/1
1 TABLET ORAL DAILY
Qty: 90 TABLET | Refills: 3 | Status: SHIPPED | OUTPATIENT
Start: 2025-06-09

## 2025-06-09 RX ORDER — ATORVASTATIN CALCIUM 20 MG/1
20 TABLET, FILM COATED ORAL DAILY
Qty: 90 TABLET | Refills: 3 | Status: SHIPPED | OUTPATIENT
Start: 2025-06-09 | End: 2025-06-10

## 2025-06-09 NOTE — PATIENT INSTRUCTIONS
Patient Education   Preventive Care Advice   This is general advice given by our system to help you stay healthy. However, your care team may have specific advice just for you. Please talk to your care team about your preventive care needs.  Nutrition  Eat 5 or more servings of fruits and vegetables each day.  Try wheat bread, brown rice and whole grain pasta (instead of white bread, rice, and pasta).  Get enough calcium and vitamin D. Check the label on foods and aim for 100% of the RDA (recommended daily allowance).  Lifestyle  Exercise at least 150 minutes each week  (30 minutes a day, 5 days a week).  Do muscle strengthening activities 2 days a week. These help control your weight and prevent disease.  No smoking.  Wear sunscreen to prevent skin cancer.  Have a dental exam and cleaning every 6 months.  Yearly exams  See your health care team every year to talk about:  Any changes in your health.  Any medicines your care team has prescribed.  Preventive care, family planning, and ways to prevent chronic diseases.  Shots (vaccines)   HPV shots (up to age 26), if you've never had them before.  Hepatitis B shots (up to age 59), if you've never had them before.  COVID-19 shot: Get this shot when it's due.  Flu shot: Get a flu shot every year.  Tetanus shot: Get a tetanus shot every 10 years.  Pneumococcal, hepatitis A, and RSV shots: Ask your care team if you need these based on your risk.  Shingles shot (for age 50 and up)  General health tests  Diabetes screening:  Starting at age 35, Get screened for diabetes at least every 3 years.  If you are younger than age 35, ask your care team if you should be screened for diabetes.  Cholesterol test: At age 39, start having a cholesterol test every 5 years, or more often if advised.  Bone density scan (DEXA): At age 50, ask your care team if you should have this scan for osteoporosis (brittle bones).  Hepatitis C: Get tested at least once in your life.  STIs (sexually  transmitted infections)  Before age 24: Ask your care team if you should be screened for STIs.  After age 24: Get screened for STIs if you're at risk. You are at risk for STIs (including HIV) if:  You are sexually active with more than one person.  You don't use condoms every time.  You or a partner was diagnosed with a sexually transmitted infection.  If you are at risk for HIV, ask about PrEP medicine to prevent HIV.  Get tested for HIV at least once in your life, whether you are at risk for HIV or not.  Cancer screening tests  Cervical cancer screening: If you have a cervix, begin getting regular cervical cancer screening tests starting at age 21.  Breast cancer scan (mammogram): If you've ever had breasts, begin having regular mammograms starting at age 40. This is a scan to check for breast cancer.  Colon cancer screening: It is important to start screening for colon cancer at age 45.  Have a colonoscopy test every 10 years (or more often if you're at risk) Or, ask your provider about stool tests like a FIT test every year or Cologuard test every 3 years.  To learn more about your testing options, visit:   .  For help making a decision, visit:   https://bit.ly/gc35806.  Prostate cancer screening test: If you have a prostate, ask your care team if a prostate cancer screening test (PSA) at age 55 is right for you.  Lung cancer screening: If you are a current or former smoker ages 50 to 80, ask your care team if ongoing lung cancer screenings are right for you.  For informational purposes only. Not to replace the advice of your health care provider. Copyright   2023 [employer]. All rights reserved. Clinically reviewed by the [facility]. [employer] 933740 - REV 01/24.

## 2025-06-09 NOTE — PROGRESS NOTES
Preventive Care Visit  [facility]  Milady Awan MD, Family Medicine  Jun 9, 2025      1. Encounter for Medicare annual wellness exam (Primary)  Dahlia is in today for her annual wellness exam.  As far as healthcare maintenance, she is due for her bone density.  She has a mammogram scheduled.  Her next colonoscopy is due in 2029.  She is up-to-date on immunizations.    2. Primary hypertension  Her blood pressure is under excellent control with her current medication.  Refills are sent and we will do blood work today.  - lisinopril-hydrochlorothiazide (ZESTORETIC) 20-12.5 MG tablet; Take 1 tablet by mouth daily.  Dispense: 90 tablet; Refill: 3  - CBC with platelets; Future  - Comprehensive metabolic panel; Future    3. Hypercholesteremia  This has been stable on her current dose of atorvastatin.  Due for blood work today.  - Lipid panel reflex to direct LDL Non-fasting; Future  - atorvastatin (LIPITOR) 20 MG tablet; Take 1 tablet (20 mg) by mouth daily.  Dispense: 90 tablet; Refill: 3    4. Chronic right shoulder pain  She did see sports medicine and had an MRI which shows a partial tear of her right rotator cuff.  She has been doing physical therapy and it is improved.    5. Incomplete tear of right rotator cuff, unspecified whether traumatic  As above    6. Chronic left shoulder pain  She is now having more pain in her left shoulder with decreased range of motion.  She will [place] back to her sports medicine doctor and potentially restart physical therapy on the side.    7. Lumbar radiculopathy  Stable with current dose of gabapentin.  - gabapentin (NEURONTIN) 600 MG tablet; TAKE ONE HALF TO ONE TABLET (300-600 MG) BY MOUTH EVERY NIGHT AT BEDTIME AS NEEDED  Dispense: 90 tablet; Refill: 3    8. Age-related osteoporosis without current pathological fracture  She has been able to tolerate the Boniva better.  She does get a little upset stomach but she can handle it as it is only once a  month.  She really does not want to have to do Prolia or anything more invasive.  Will check her bone density this year.  She has started exercising at the [facility] and I encouraged her to continue to do so.    9. Asymptomatic Postmenopausal Status    - DX Bone Density; Future    10. Class 1 obesity with serious comorbidity and body mass index (BMI) of 32.0 to 32.9 in adult, unspecified obesity type  She has started going to the [facility] just a couple of weeks ago.  She plans on exercising more regularly.  Her comorbidities include hypertension, hyperlipidemia, osteoarthritis of the knee and back.  Appropriate preventive services were addressed with this patient via screening, questionnaire, or discussion as appropriate for fall prevention, nutrition, physical activity, Tobacco-use cessation, social engagement, weight loss and cognition.  Checklist reviewing preventive services available has been given to the patient.  Reviewed patient's diet, addressing concerns and/or questions.   The patient was instructed to see the dentist every 6 months.   The patient was provided with written information regarding signs of hearing loss.   I have reviewed Opioid Use Disorder and Substance Use Disorder risk factors and made any needed referrals.      Ping Villagomez is a 70 year old, presenting for the following:  Physical (Fasting. )        6/9/2025     7:12 AM   Additional Questions   Roomed by           HPI  She comes in today for her annual wellness exam.  Overall she has been doing well.  She just joined the [facility] and has started exercising.  She was doing physical therapy for her right shoulder and had an MRI that showed a partial tear of her rotator cuff.  She is now having issues with her left shoulder.  She plans on following up with her orthopedist with her knee and may need a knee replacement at some point.  She states otherwise she is feeling well.    The longitudinal plan of care for the diagnosis(es)/condition(s) as  documented were addressed during this visit. Due to the added complexity in care, I will continue to support Dahlia in the subsequent management and with ongoing continuity of care.         Advance Care Planning    Discussed advance care planning with patient; informed AVS has link to [facility].        6/4/2025   General Health   How would you rate your overall physical health? Good   Feel stress (tense, anxious, or unable to sleep) Not at all         6/4/2025   Nutrition   Diet: Regular (no restrictions)         6/4/2025   Exercise   Days per week of moderate/strenous exercise 3 days   Average minutes spent exercising at this level 30 min         6/4/2025   Social Factors   Frequency of gathering with friends or relatives Once a week   Worry food won't last until get money to buy more No   Food not last or not have enough money for food? No   Do you have housing? (Housing is defined as stable permanent housing and does not include staying outside in a car, in a tent, in an abandoned building, in an overnight shelter, or couch-surfing.) Yes   Are you worried about losing your housing? No   Lack of transportation? No   Unable to get utilities (heat,electricity)? No         6/4/2025   Fall Risk   Fallen 2 or more times in the past year? No   Trouble with walking or balance? No          6/4/2025   Activities of Daily Living- Home Safety   Needs help with the following daily activites None of the above   Safety concerns in the home None of the above         6/4/2025   Dental   Dentist two times every year? Yes         6/4/2025   Hearing Screening   Hearing concerns? None of the above         6/4/2025   Driving Risk Screening   Patient/family members have concerns about driving No         6/4/2025   General Alertness/Fatigue Screening   Have you been more tired than usual lately? No         6/4/2025   Urinary Incontinence Screening   Bothered by leaking urine in past 6 months No         Today's PHQ-2 Score:        6/9/2025     7:11 AM   PHQ-2 ( 1999 [employer])   Q1: Little interest or pleasure in doing things 0   Q2: Feeling down, depressed or hopeless 0   PHQ-2 Score 0    Q1: Little interest or pleasure in doing things Not at all   Q2: Feeling down, depressed or hopeless Not at all   PHQ-2 Score 0       Patient-reported           6/4/2025   Substance Use   Alcohol more than 3/day or more than 7/wk No   Do you have a current opioid prescription? No   How severe/bad is pain from 1 to 10? 2/10   Do you use any other substances recreationally? No     Social History     Tobacco Use    Smoking status: Never    Smokeless tobacco: Never   Vaping Use    Vaping status: Never Used   Substance Use Topics    Alcohol use: No           6/12/2024   LAST FHS-7 RESULTS   1st degree relative breast or ovarian cancer No   Any relative bilateral breast cancer No   Any male have breast cancer No   Any ONE woman have BOTH breast AND ovarian cancer No   Any woman with breast cancer before 50yrs No   2 or more relatives with breast AND/OR ovarian cancer No   2 or more relatives with breast AND/OR bowel cancer No        Mammogram Screening - Mammogram every 1-2 years updated in Health Maintenance based on mutual decision making    ASCVD Risk   The 10-year ASCVD risk score (Yelena CONTRERAS, et al., 2019) is: 15.2%    Values used to calculate the score:      Age: 70 years      Sex: Female      Is Non- : No      Diabetic: No      Tobacco smoker: No      Systolic Blood Pressure: 131 mmHg      Is BP treated: Yes      HDL Cholesterol: 43 mg/dL      Total Cholesterol: 203 mg/dL            Reviewed and updated as needed this visit by Provider                    Lab work is in process  Current Outpatient Medications   Medication Sig Dispense Refill    atorvastatin (LIPITOR) 20 MG tablet Take 1 tablet (20 mg) by mouth daily. 90 tablet 3    gabapentin (NEURONTIN) 600 MG tablet TAKE ONE HALF TO ONE TABLET (300-600 MG) BY MOUTH EVERY NIGHT  "AT BEDTIME AS NEEDED 90 tablet 3    IBANdronate (BONIVA) 150 MG tablet TAKE 1 TABLET (150 MG) BY MOUTH EVERY 30 DAYS 3 tablet 3    lisinopril-hydrochlorothiazide (ZESTORETIC) 20-12.5 MG tablet Take 1 tablet by mouth daily. 90 tablet 3    vitamin D2 (ERGOCALCIFEROL) 58889 units (1250 mcg) capsule TAKE 1 CAPSULE (50,000 UNITS) BY MOUTH ONCE A WEEK 12 capsule 3     Current providers sharing in care for this patient include:  Patient Care Team:  Milady Awan MD as PCP - General (Family Medicine)  Milady Awan MD as Assigned PCP  Valentine Murphy MD as Assigned Musculoskeletal Provider    The following health maintenance items are reviewed in Epic and correct as of today:  Health Maintenance   Topic Date Due    ANNUAL REVIEW OF HM ORDERS  03/17/2023    COVID-19 VACCINE (7 - 2024-25 season) 05/15/2025    BMP  05/30/2025    LIPID  05/30/2025    MEDICARE ANNUAL WELLNESS VISIT  05/30/2025    FALL RISK ASSESSMENT  06/09/2026    MAMMO SCREENING  06/12/2026    DIABETES SCREENING  05/30/2027    COLORECTAL CANCER SCREENING  05/14/2029    ADVANCE CARE PLANNING  05/30/2029    RSV VACCINE (1 - 1-dose 75+ series) 06/04/2029    DTAP/TDAP/TD VACCINE (3 - Td or Tdap) 03/17/2032    DEXA  05/18/2038    HEPATITIS C SCREENING  Completed    PHQ-2 (once per calendar year)  Completed    INFLUENZA VACCINE  Completed    PNEUMOCOCCAL VACCINE 50+ YEARS  Completed    ZOSTER VACCINE  Completed    HPV VACCINE  Aged Out    MENINGITIS VACCINE  Aged Out         Review of Systems  Constitutional, HEENT, cardiovascular, pulmonary, gi and gu systems are negative, except as otherwise noted.     Objective    Exam  /60 (BP Location: Right arm)   Pulse 58   Temp 97.8  F (36.6  C)   Resp 16   Ht 1.505 m (4' 11.25\")   Wt 72.6 kg (160 lb)   LMP  (LMP Unknown)   SpO2 98%   BMI 32.04 kg/m     Estimated body mass index is 32.04 kg/m  as calculated from the following:    Height as of this encounter: 1.505 m (4' 11.25\").    Weight as of this " encounter: 72.6 kg (160 lb).    Physical Exam  GENERAL: alert and no distress  EYES: Eyes grossly normal to inspection, PERRL and conjunctivae and sclerae normal  HENT: ear canals and TM's normal, nose and mouth without ulcers or lesions  NECK: no adenopathy, no asymmetry, masses, or scars  RESP: lungs clear to auscultation - no rales, rhonchi or wheezes  CV: regular rate and rhythm, normal S1 S2, no S3 or S4, no murmur, click or rub, no peripheral edema  ABDOMEN: soft, nontender, no hepatosplenomegaly, no masses and bowel sounds normal  MS: no gross musculoskeletal defects noted, no edema  SKIN: no suspicious lesions or rashes  NEURO: Normal strength and tone, mentation intact and speech normal  PSYCH: mentation appears normal, affect normal/bright         6/9/2025   Mini Cog   Clock Draw Score 2 Normal   3 Item Recall 3 objects recalled   Mini Cog Total Score 5             5/3/2023   Vision Screen   Vision Screen Results Pass       Signed Electronically by: Milady Awan MD

## 2025-06-10 ENCOUNTER — RESULTS FOLLOW-UP (OUTPATIENT)
Dept: FAMILY MEDICINE | Facility: CLINIC | Age: 70
End: 2025-06-10
Payer: COMMERCIAL

## 2025-06-10 DIAGNOSIS — E78.00 HYPERCHOLESTEREMIA: ICD-10-CM

## 2025-06-10 RX ORDER — ATORVASTATIN CALCIUM 40 MG/1
40 TABLET, FILM COATED ORAL DAILY
Qty: 90 TABLET | Refills: 3 | Status: SHIPPED | OUTPATIENT
Start: 2025-06-10

## 2025-06-13 ENCOUNTER — ANCILLARY PROCEDURE (OUTPATIENT)
Dept: MAMMOGRAPHY | Facility: CLINIC | Age: 70
End: 2025-06-13
Attending: FAMILY MEDICINE
Payer: COMMERCIAL

## 2025-06-13 DIAGNOSIS — Z12.31 VISIT FOR SCREENING MAMMOGRAM: ICD-10-CM

## 2025-06-13 PROCEDURE — 77063 BREAST TOMOSYNTHESIS BI: CPT
